# Patient Record
Sex: MALE | Race: WHITE
[De-identification: names, ages, dates, MRNs, and addresses within clinical notes are randomized per-mention and may not be internally consistent; named-entity substitution may affect disease eponyms.]

---

## 2018-01-25 PROBLEM — Z00.00 ENCOUNTER FOR PREVENTIVE HEALTH EXAMINATION: Status: ACTIVE | Noted: 2018-01-25

## 2018-02-20 ENCOUNTER — APPOINTMENT (OUTPATIENT)
Dept: PLASTIC SURGERY | Facility: CLINIC | Age: 63
End: 2018-02-20
Payer: COMMERCIAL

## 2018-02-20 VITALS — BODY MASS INDEX: 31.1 KG/M2 | WEIGHT: 210 LBS | HEIGHT: 69 IN

## 2018-02-20 DIAGNOSIS — Z86.79 PERSONAL HISTORY OF OTHER DISEASES OF THE CIRCULATORY SYSTEM: ICD-10-CM

## 2018-02-20 DIAGNOSIS — Z80.42 FAMILY HISTORY OF MALIGNANT NEOPLASM OF PROSTATE: ICD-10-CM

## 2018-02-20 DIAGNOSIS — C64.1 MALIGNANT NEOPLASM OF RIGHT KIDNEY, EXCEPT RENAL PELVIS: ICD-10-CM

## 2018-02-20 DIAGNOSIS — J44.9 CHRONIC OBSTRUCTIVE PULMONARY DISEASE, UNSPECIFIED: ICD-10-CM

## 2018-02-20 DIAGNOSIS — L72.9 FOLLICULAR CYST OF THE SKIN AND SUBCUTANEOUS TISSUE, UNSPECIFIED: ICD-10-CM

## 2018-02-20 DIAGNOSIS — Z80.3 FAMILY HISTORY OF MALIGNANT NEOPLASM OF BREAST: ICD-10-CM

## 2018-02-20 PROCEDURE — 99203 OFFICE O/P NEW LOW 30 MIN: CPT

## 2018-02-20 RX ORDER — UMECLIDINIUM BROMIDE AND VILANTEROL TRIFENATATE 62.5; 25 UG/1; UG/1
62.5-25 POWDER RESPIRATORY (INHALATION)
Refills: 0 | Status: ACTIVE | COMMUNITY

## 2018-02-20 RX ORDER — VARENICLINE TARTRATE 1 MG/1
1 TABLET, FILM COATED ORAL
Refills: 0 | Status: ACTIVE | COMMUNITY

## 2018-02-20 RX ORDER — ATENOLOL 100 MG/1
100 TABLET ORAL
Refills: 0 | Status: ACTIVE | COMMUNITY

## 2018-02-20 RX ORDER — FAMOTIDINE 40 MG/1
40 TABLET, FILM COATED ORAL
Refills: 0 | Status: ACTIVE | COMMUNITY

## 2018-02-20 RX ORDER — ASPIRIN ENTERIC COATED TABLETS 81 MG 81 MG/1
81 TABLET, DELAYED RELEASE ORAL
Refills: 0 | Status: ACTIVE | COMMUNITY

## 2018-02-20 RX ORDER — LOSARTAN POTASSIUM 50 MG/1
50 TABLET, FILM COATED ORAL
Refills: 0 | Status: ACTIVE | COMMUNITY

## 2018-02-20 RX ORDER — HYDROCHLOROTHIAZIDE 25 MG/1
25 TABLET ORAL
Refills: 0 | Status: ACTIVE | COMMUNITY

## 2018-04-20 ENCOUNTER — APPOINTMENT (OUTPATIENT)
Dept: PLASTIC SURGERY | Facility: CLINIC | Age: 63
End: 2018-04-20

## 2018-05-11 ENCOUNTER — APPOINTMENT (OUTPATIENT)
Dept: PLASTIC SURGERY | Facility: CLINIC | Age: 63
End: 2018-05-11

## 2019-12-27 ENCOUNTER — EMERGENCY (EMERGENCY)
Facility: HOSPITAL | Age: 64
LOS: 0 days | Discharge: HOME | End: 2019-12-27
Attending: EMERGENCY MEDICINE | Admitting: INTERNAL MEDICINE
Payer: COMMERCIAL

## 2019-12-27 VITALS
TEMPERATURE: 98 F | SYSTOLIC BLOOD PRESSURE: 121 MMHG | HEART RATE: 62 BPM | DIASTOLIC BLOOD PRESSURE: 75 MMHG | RESPIRATION RATE: 18 BRPM

## 2019-12-27 VITALS
HEART RATE: 69 BPM | DIASTOLIC BLOOD PRESSURE: 87 MMHG | OXYGEN SATURATION: 95 % | TEMPERATURE: 96 F | SYSTOLIC BLOOD PRESSURE: 130 MMHG | RESPIRATION RATE: 16 BRPM
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
## 2019-12-27 DIAGNOSIS — I10 ESSENTIAL (PRIMARY) HYPERTENSION: ICD-10-CM

## 2019-12-27 DIAGNOSIS — R55 SYNCOPE AND COLLAPSE: ICD-10-CM

## 2019-12-27 DIAGNOSIS — C78.00 SECONDARY MALIGNANT NEOPLASM OF UNSPECIFIED LUNG: ICD-10-CM

## 2019-12-27 DIAGNOSIS — S09.90XA UNSPECIFIED INJURY OF HEAD, INITIAL ENCOUNTER: ICD-10-CM

## 2019-12-27 DIAGNOSIS — E78.5 HYPERLIPIDEMIA, UNSPECIFIED: ICD-10-CM

## 2019-12-27 DIAGNOSIS — Z85.53 PERSONAL HISTORY OF MALIGNANT NEOPLASM OF RENAL PELVIS: ICD-10-CM

## 2019-12-27 DIAGNOSIS — R05 COUGH: ICD-10-CM

## 2019-12-27 DIAGNOSIS — Z90.5 ACQUIRED ABSENCE OF KIDNEY: ICD-10-CM

## 2019-12-27 LAB
ALBUMIN SERPL ELPH-MCNC: 4.3 G/DL — SIGNIFICANT CHANGE UP (ref 3.5–5.2)
ALP SERPL-CCNC: 89 U/L — SIGNIFICANT CHANGE UP (ref 30–115)
ALT FLD-CCNC: 21 U/L — SIGNIFICANT CHANGE UP (ref 0–41)
ANION GAP SERPL CALC-SCNC: 18 MMOL/L — HIGH (ref 7–14)
AST SERPL-CCNC: 16 U/L — SIGNIFICANT CHANGE UP (ref 0–41)
BASOPHILS # BLD AUTO: 0.05 K/UL — SIGNIFICANT CHANGE UP (ref 0–0.2)
BASOPHILS NFR BLD AUTO: 0.5 % — SIGNIFICANT CHANGE UP (ref 0–1)
BILIRUB DIRECT SERPL-MCNC: <0.2 MG/DL — SIGNIFICANT CHANGE UP (ref 0–0.2)
BILIRUB INDIRECT FLD-MCNC: >0.3 MG/DL — SIGNIFICANT CHANGE UP (ref 0.2–1.2)
BILIRUB SERPL-MCNC: 0.5 MG/DL — SIGNIFICANT CHANGE UP (ref 0.2–1.2)
BUN SERPL-MCNC: 42 MG/DL — HIGH (ref 10–20)
CALCIUM SERPL-MCNC: 10.1 MG/DL — SIGNIFICANT CHANGE UP (ref 8.5–10.1)
CHLORIDE SERPL-SCNC: 100 MMOL/L — SIGNIFICANT CHANGE UP (ref 98–110)
CO2 SERPL-SCNC: 23 MMOL/L — SIGNIFICANT CHANGE UP (ref 17–32)
CREAT SERPL-MCNC: 1.5 MG/DL — SIGNIFICANT CHANGE UP (ref 0.7–1.5)
EOSINOPHIL # BLD AUTO: 0.14 K/UL — SIGNIFICANT CHANGE UP (ref 0–0.7)
EOSINOPHIL NFR BLD AUTO: 1.3 % — SIGNIFICANT CHANGE UP (ref 0–8)
GLUCOSE SERPL-MCNC: 192 MG/DL — HIGH (ref 70–99)
HCT VFR BLD CALC: 45.5 % — SIGNIFICANT CHANGE UP (ref 42–52)
HGB BLD-MCNC: 14.5 G/DL — SIGNIFICANT CHANGE UP (ref 14–18)
IMM GRANULOCYTES NFR BLD AUTO: 0.8 % — HIGH (ref 0.1–0.3)
LIDOCAIN IGE QN: 27 U/L — SIGNIFICANT CHANGE UP (ref 7–60)
LYMPHOCYTES # BLD AUTO: 1.67 K/UL — SIGNIFICANT CHANGE UP (ref 1.2–3.4)
LYMPHOCYTES # BLD AUTO: 16 % — LOW (ref 20.5–51.1)
MAGNESIUM SERPL-MCNC: 1.9 MG/DL — SIGNIFICANT CHANGE UP (ref 1.8–2.4)
MCHC RBC-ENTMCNC: 28.4 PG — SIGNIFICANT CHANGE UP (ref 27–31)
MCHC RBC-ENTMCNC: 31.9 G/DL — LOW (ref 32–37)
MCV RBC AUTO: 89 FL — SIGNIFICANT CHANGE UP (ref 80–94)
MONOCYTES # BLD AUTO: 0.66 K/UL — HIGH (ref 0.1–0.6)
MONOCYTES NFR BLD AUTO: 6.3 % — SIGNIFICANT CHANGE UP (ref 1.7–9.3)
NEUTROPHILS # BLD AUTO: 7.86 K/UL — HIGH (ref 1.4–6.5)
NEUTROPHILS NFR BLD AUTO: 75.1 % — SIGNIFICANT CHANGE UP (ref 42.2–75.2)
NRBC # BLD: 0 /100 WBCS — SIGNIFICANT CHANGE UP (ref 0–0)
PLATELET # BLD AUTO: 229 K/UL — SIGNIFICANT CHANGE UP (ref 130–400)
POTASSIUM SERPL-MCNC: 3.8 MMOL/L — SIGNIFICANT CHANGE UP (ref 3.5–5)
POTASSIUM SERPL-SCNC: 3.8 MMOL/L — SIGNIFICANT CHANGE UP (ref 3.5–5)
PROT SERPL-MCNC: 6.8 G/DL — SIGNIFICANT CHANGE UP (ref 6–8)
RBC # BLD: 5.11 M/UL — SIGNIFICANT CHANGE UP (ref 4.7–6.1)
RBC # FLD: 13 % — SIGNIFICANT CHANGE UP (ref 11.5–14.5)
SODIUM SERPL-SCNC: 141 MMOL/L — SIGNIFICANT CHANGE UP (ref 135–146)
TROPONIN T SERPL-MCNC: <0.01 NG/ML — SIGNIFICANT CHANGE UP
TROPONIN T SERPL-MCNC: <0.01 NG/ML — SIGNIFICANT CHANGE UP
WBC # BLD: 10.46 K/UL — SIGNIFICANT CHANGE UP (ref 4.8–10.8)
WBC # FLD AUTO: 10.46 K/UL — SIGNIFICANT CHANGE UP (ref 4.8–10.8)

## 2019-12-27 PROCEDURE — 72125 CT NECK SPINE W/O DYE: CPT | Mod: 26

## 2019-12-27 PROCEDURE — 93010 ELECTROCARDIOGRAM REPORT: CPT

## 2019-12-27 PROCEDURE — 99220: CPT | Mod: 25

## 2019-12-27 PROCEDURE — 70450 CT HEAD/BRAIN W/O DYE: CPT | Mod: 26

## 2019-12-27 PROCEDURE — 71045 X-RAY EXAM CHEST 1 VIEW: CPT | Mod: 26

## 2019-12-27 PROCEDURE — 12002 RPR S/N/AX/GEN/TRNK2.6-7.5CM: CPT

## 2019-12-27 RX ORDER — ACETAMINOPHEN 500 MG
650 TABLET ORAL ONCE
Refills: 0 | Status: COMPLETED | OUTPATIENT
Start: 2019-12-27 | End: 2019-12-27

## 2019-12-27 RX ORDER — TETANUS TOXOID, REDUCED DIPHTHERIA TOXOID AND ACELLULAR PERTUSSIS VACCINE, ADSORBED 5; 2.5; 8; 8; 2.5 [IU]/.5ML; [IU]/.5ML; UG/.5ML; UG/.5ML; UG/.5ML
0.5 SUSPENSION INTRAMUSCULAR ONCE
Refills: 0 | Status: COMPLETED | OUTPATIENT
Start: 2019-12-27 | End: 2019-12-27

## 2019-12-27 RX ORDER — SODIUM CHLORIDE 9 MG/ML
1000 INJECTION, SOLUTION INTRAVENOUS ONCE
Refills: 0 | Status: COMPLETED | OUTPATIENT
Start: 2019-12-27 | End: 2019-12-27

## 2019-12-27 RX ADMIN — Medication 650 MILLIGRAM(S): at 13:38

## 2019-12-27 RX ADMIN — TETANUS TOXOID, REDUCED DIPHTHERIA TOXOID AND ACELLULAR PERTUSSIS VACCINE, ADSORBED 0.5 MILLILITER(S): 5; 2.5; 8; 8; 2.5 SUSPENSION INTRAMUSCULAR at 11:37

## 2019-12-27 RX ADMIN — SODIUM CHLORIDE 1000 MILLILITER(S): 9 INJECTION, SOLUTION INTRAVENOUS at 13:40

## 2019-12-27 NOTE — ED ADULT NURSE NOTE - NSIMPLEMENTINTERV_GEN_ALL_ED
Implemented All Fall Risk Interventions:  [place] to call system. Call bell, personal items and telephone within reach. Instruct patient to call for assistance. Room bathroom lighting operational. Non-slip footwear when patient is off stretcher. Physically safe environment: no spills, clutter or unnecessary equipment. Stretcher in lowest position, wheels locked, appropriate side rails in place. Provide visual cue, wrist band, yellow gown, etc. Monitor gait and stability. Monitor for mental status changes and reorient to person, place, and time. Review medications for side effects contributing to fall risk. Reinforce activity limits and safety measures with patient and family.

## 2019-12-27 NOTE — ED CDU PROVIDER DISPOSITION NOTE - NSFOLLOWUPINSTRUCTIONS_ED_ALL_ED_FT
Closed Head Injury    Closed head injury in an injury to your head that may or may not involve a traumatic brain injury (TBI). Symptoms of TBI can be short or long lasting and include headache, dizziness, interference with memory or speech, fatigue, confusion, changes in sleep, mood changes, nausea, depression/anxiety, and dulling of senses. Make sure to obtain proper rest which includes getting plenty of sleep, avoiding excessive visual stimulation, and avoiding activities that may cause physical or mental stress. Avoid any situation where there is potential for another head injury including sports.    SEEK MEDICAL CARE IF YOU HAVE THE FOLLOWING SYMPTOMS: unusual drowsiness, vomiting, severe dizziness, seizures, lightheadedness, muscular weakness, different pupil sizes, visual changes, or clear or bloody discharge from your ears or nose.      Follow up with your primary care physician. If a lung nodule is new or has changed in size, shape or appearance, your doctor may recommend further testing — such as a CT scan, positron emission tomography (PET) scan, bronchoscopy or tissue biopsy — to determine if it's cancerous.     PULMONARY NODULES - AfterCare(R) Instructions(ER/ED)     Pulmonary Nodules    WHAT YOU NEED TO KNOW:    Pulmonary nodules are areas of abnormal tissue in your lungs. You may not have any symptoms, or you may have chest tightness, a cough, chest pain, or shortness of breath. Nodules are usually found with an x-ray or CT scan. Most nodules are not cancerous. However, it is still important for you to return for follow-up testing to monitor your condition.     DISCHARGE INSTRUCTIONS:    Call 911 for any of the following:     You have severe shortness of breath or trouble breathing.       Your lips or nails look blue or pale.    Return to the emergency department if:     You cough up blood.      You suddenly feel lightheaded or are short of breath.      You have chest pain when you take a deep breath or cough.      You cannot think clearly.    Contact your healthcare provider if:     Your symptoms do not improve.      You have new symptoms.       You have questions or concerns about your condition or care.    Follow up with your healthcare provider: Your healthcare provider will refer you to a pulmonologist. Your pulmonologist will monitor your nodules for any change or growth. Nodules that grow quickly may require a biopsy to check for cancer. You may need to be monitored for 1 to 3 years. Write down your questions so you remember to ask them during your visits.     Do not smoke: Nicotine and other chemicals in cigarettes and cigars can cause lung damage or cancer. Stay away from others who smoke. Ask your healthcare provider for information if you or someone close to you currently smokes and needs help to quit. E-cigarettes or smokeless tobacco still contain nicotine. Talk to your healthcare provider before you use these products.        © Copyright [employer] 2019           Syncope    Syncope is when you temporarily lose consciousness, also called fainting or passing out. It is caused by a sudden decrease in blood flow to the brain. Even though most causes of syncope are not dangerous, syncope can possibly be a sign of a serious medical problem. Signs that you may be about to faint include feeling dizzy, lightheaded, nausea, visual changes, or cold/clammy skin. Do not drive, operate heavy machinery, or play sports until your health care provider says it is okay.    SEEK IMMEDIATE MEDICAL CARE IF YOU HAVE ANY OF THE FOLLOWING SYMPTOMS: severe headache, pain in your chest/abdomen/back, bleeding from your mouth or rectum, palpitations, shortness of breath, pain with breathing, seizure, confusion, or trouble walking.    Follow up with your primary medical doctor in 1-2 days

## 2019-12-27 NOTE — ED CDU PROVIDER INITIAL DAY NOTE - OBJECTIVE STATEMENT
64 y.o male w/ hx of renal ca w/ metastasis to lung, HTN, s/p nephrectomy presents to the ED for evaluation of fall. 64 y.o male w/ hx of renal ca w/ metastasis to lung, HTN, s/p nephrectomy presents to the ED for evaluation of fall.  States he was having a coughing fit, felt lightheaded and fell down with questionable LOC.  Sustained a laceration to L parietal scalp.  Since then acting at baseline with no recurrent sxs.  Complaining of mild pain L scalp, mild severity, worse w/ palpation, alleviated with tylenol, no radiation of pain.  Had recent echo in April at [facility] which was WNL.  Denies preceding chest pain, dyspnea, headache, vomiting, back pain/abd pain, extremity pain.

## 2019-12-27 NOTE — ED PROVIDER NOTE - OBJECTIVE STATEMENT
64 year old male with a pmh of htn hld kidney ca with meds to lung not currently on chemotherarpy presents here s/p syncopal event. Patient was drinking hot chocolate and suddenly fell. + loc not on a/c. Patient sustained a laceration. Patient has had a chronic cough no new cough. Denies any recent fever chills cp sob n/v abdominal pain urinary complaints or prodormal symptoms.     Cardiologist: james

## 2019-12-27 NOTE — ED CDU PROVIDER INITIAL DAY NOTE - NS ED ROS FT
Constitutional: See HPI.  Eyes: No visual changes, eye pain or discharge.   ENMT: No hearing changes, pain, discharge or infections. No neck pain or stiffness. No limited ROM  Cardiac: No SOB or edema. No chest pain with exertion.  Respiratory: + chronic cough from lung ca. No respiratory distress. No hemoptysis. No history of asthma or RAD.  GI: No nausea, vomiting, diarrhea or abdominal pain.  : No dysuria, frequency or burning. No Discharge  MS: No myalgia, muscle weakness, joint pain or back pain.  Neuro: No headache or weakness. questionable LOC.  Skin: + lac, + scalp pain   Except as documented in the HPI, all other systems are negative.

## 2019-12-27 NOTE — ED ADULT TRIAGE NOTE - CHIEF COMPLAINT QUOTE
pt fell asleep while reading newspaper, fell onto floor off chair. laceration to head. GCS=15. no blood thinners.

## 2019-12-27 NOTE — ED CDU PROVIDER DISPOSITION NOTE - ATTENDING CONTRIBUTION TO CARE
63yo man h/o renal CA s/p nephrectomy, known metastasis to the lungs followed at [facility], HTN, HLD was placed in CDU after a syncopal episode. Pt was having a "coughing episode" and then felt lightheaded and passed out briefly. Sustained lac to L parietal scalp which was repaired in the ED. Otherwise feeling at well, no chest pain, HA, nausea, vomiting, weakness. Pt has frequent coughing fits and wheezing at baseline. VS, exam as noted, pt alert and comfortable, lac as noted, neck supple, CVS1S2 RRR abd soft, NT, ND. ED workup with EKG, labs, head and C-spine CT was unremarkable. Pt was monitored in CDU, repeat EKG and trop were normal. Wife at the bedside produced echo results from 4/2019 which was normal (and no change from 2018 echo was noted). Pt follows with Dr Cavazos, no need to repeat echo in [facility], pt d/c to follow up, wound care instructions provided.

## 2019-12-27 NOTE — ED CDU PROVIDER DISPOSITION NOTE - CARE PROVIDER_API CALL
Shayan Cavazos)  Cardiology; Interventional Cardiology  [address], [address]  [place] [address]  Phone: [phone]  Fax: [phone]  Follow Up Time: 1-3 Days    Ricky Downey)  Critical Care Medicine; Geriatric Medicine; Internal Medicine; Pulmonary Disease  [address] [place] [address], [address]  [place] [address]  Phone: [phone]  Fax: [phone]  Follow Up Time: 1-3 Days Shayan Cavazos)  Cardiology; Interventional Cardiology  [address], [address]  [place] [address]  Phone: [phone]  Fax: [phone]  Follow Up Time: 1-3 Days    Ricky Downey)  Critical Care Medicine; Geriatric Medicine; Internal Medicine; Pulmonary Disease  [address], [address]  [place] [address]  Phone: [phone]  Fax: [phone]  Follow Up Time: 1-3 Days    Andrez Diaz)  Otolaryngology  [address], [address] [place] [address]  Phone: [phone]  Fax: [phone]  Follow Up Time: 1-3 Days

## 2019-12-27 NOTE — ED CDU PROVIDER INITIAL DAY NOTE - MEDICAL DECISION MAKING DETAILS
63yo man h/o renal CA s/p nephrectomy, known metastasis to the lungs followed at [facility], HTN, HLD was placed in CDU after a syncopal episode. Pt was having a "coughing episode" and then felt lightheaded and passed out briefly. Sustained lac to L parietal scalp which was repaired in the ED. Otherwise feeling at well, no chest pain, HA, nausea, vomiting, weakness. Pt has frequent coughing fits and wheezing at baseline. VS, exam as noted, pt alert and comfortable, lac as noted, neck supple, CVS1S2 RRR abd soft, NT, ND. ED workup with EKG, labs, head and C-spine CT was unremarkable. Plan is for serial EKG/enzymes/echo.

## 2019-12-27 NOTE — ED PROVIDER NOTE - ATTENDING CONTRIBUTION TO CARE
64 year old male, pmhx HTN, HLD, Kidney CA with mets to lung (not on chemo) presenting s/p syncopal episode. Patient was drinking hot chocolate and then suddenly had LOC. (+) fell hitting his head. Patient denies full memory of the event but states he does not remember having any lightheadedness, chest pain, dyspnea, headache, abdominal pain or any other pre-syncopal symptoms. At this time patient endorsing isolated head pain described as sharp, non-radiating, located at the top of his head, no palliative or provocative factors, mild severity.     Vital Signs: I have reviewed the initial vital signs.  Constitutional: NAD, well-nourished, appears stated age, no acute distress.  HEENT: Airway patent, moist MM, no erythema/swelling/deformity of oral structures. EOMI, PERRLA.  CV: regular rate, regular rhythm, well-perfused extremities, 2+ b/l DP and radial pulses equal.  Lungs: BCTA, no increased WOB.  ABD: NTND, no guarding or rebound, no pulsatile mass, no hernias.   MSK: Neck supple, nontender, nl ROM, no stepoff. Chest nontender. Back nontender in TLS spine or to b/l bony structures or flanks. Ext nontender, nl rom, no deformity.   INTEG: Skin warm, dry, no rash. (+) 3cm scalp laceration, bleeding controlled  NEURO: A&Ox3, normal strength, nl sensation throughout, normal speech.   PSYCH: Calm, cooperative, normal affect and interaction.    Neuro intact. Will obtain syncopal work up, CT head and c-spine for trauma, irrigate and staple laceration, re-eval.

## 2019-12-27 NOTE — ED CDU PROVIDER DISPOSITION NOTE - PROVIDER TOKENS
PROVIDER:[TOKEN:[[id number]],FOLLOWUP:[1-3 Days]],PROVIDER:[TOKEN:[[id number]],FOLLOWUP:[1-3 Days]] PROVIDER:[TOKEN:[[id number]],FOLLOWUP:[1-3 Days]],PROVIDER:[TOKEN:[[id number]],FOLLOWUP:[1-3 Days]],PROVIDER:[TOKEN:[[id number]],FOLLOWUP:[1-3 Days]]

## 2019-12-27 NOTE — ED CDU PROVIDER INITIAL DAY NOTE - PHYSICAL EXAMINATION
CONST: Well appearing in NAD  HEAD: NC, linear sutured lac L scalp, no step off or deformity, no bleeding, no surrounding erythema  EYES: PERRL, EOMI, Sclera and conjunctiva clear.  NECK: No midline C/T/L tenderness  CARD: Normal S1 S2; Normal rate and rhythm  RESP: Equal BS B/L, No wheezes, rhonchi or rales. No distress  GI: Soft, non-tender, non-distended.  MS: Normal ROM in all extremities. No edema of lower extremities, no calf pain, radial pulses 2+ bilaterally  SKIN: see head exam  NEURO: A&Ox3, No focal deficits. Strength 5/5 with no sensory deficits. Steady gait

## 2019-12-27 NOTE — ED ADULT NURSE NOTE - PMH
High cholesterol    HTN (hypertension)    Kidney cancer, primary, with metastasis from kidney to other site
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
## 2019-12-27 NOTE — ED CDU PROVIDER DISPOSITION NOTE - CLINICAL COURSE
63yo man h/o renal CA s/p nephrectomy, known metastasis to the lungs followed at [facility], HTN, HLD was placed in CDU after a syncopal episode. Pt was having a "coughing episode" and then felt lightheaded and passed out briefly. Sustained lac to L parietal scalp which was repaired in the ED. Otherwise feeling at well, no chest pain, HA, nausea, vomiting, weakness. Pt has frequent coughing fits and wheezing at baseline. VS, exam as noted, pt alert and comfortable, lac as noted, neck supple, CVS1S2 RRR abd soft, NT, ND. ED workup with EKG, labs, head and C-spine CT was unremarkable. Pt was monitored in CDU, repeat EKG and trop were normal. Wife at the bedside produced echo results from 4/2019 which was normal (and no change from 2018 echo was noted). Pt follows with Dr Cavazos, no need to repeat echo in [facility], pt d/c to follow up, wound care instructions provided.

## 2019-12-27 NOTE — ED CDU PROVIDER DISPOSITION NOTE - CARE PROVIDERS DIRECT ADDRESSES
[email or web address]

## 2019-12-27 NOTE — ED PROVIDER NOTE - NS ED ROS FT
Constitutional: See HPI.  Eyes: No visual changes,   ENMT: No neck pain  Cardiac: No cp  Respiratory: + cough   GI: No nausea, vomiting, diarrhea or abdominal pain.  : No dysuria, frequency or burning.   MS: No myalgia, muscle weakness, joint pain or back pain.  Psych: No suicidal or homicidal ideations.  Neuro: see hpi  Skin: No skin rash.
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
## 2019-12-27 NOTE — ED CDU PROVIDER DISPOSITION NOTE - PATIENT PORTAL LINK FT
You can access the FollowMyHealth Patient Portal offered by [facility] by registering at the following website: [email or web address]. By joining [employer]’s FollowMyHealth portal, you will also be able to view your health information using other applications (apps) compatible with our system.

## 2019-12-27 NOTE — ED PROVIDER NOTE - PHYSICAL EXAMINATION
CONSTITUTIONAL: WA / WN / NAD  HEAD: + 3cm left scalp laceration  EYES: PERRL; EOMI;   ENT: Normal pharynx; mucous membranes pink/moist, no erythema.  NECK: Supple; no meningeal signs  CARD: RRR; nl S1/S2; no M/R/G.   RESP: Respiratory rate and effort are normal; breath sounds clear and equal bilaterally.  ABD: Soft, NT ND   MSK/EXT: No gross deformities; full range of motion.  SKIN: Warm and dry;   NEURO: AAOx3,  PSYCH: Memory Intact, Normal Affect

## 2019-12-27 NOTE — ED CDU PROVIDER INITIAL DAY NOTE - PROGRESS NOTE DETAILS
Discussed results with pt.  All questions were answered and return precautions discussed.  Pt is asx and comfortable at this time.  Unremarkable re-exam.  No further concerns at this time from pt.  Will follow up with PMD and cardio.  Pt understands and agrees with tx plan. discussed all incidental findings with pt.

## 2020-09-17 ENCOUNTER — EMERGENCY (EMERGENCY)
Facility: HOSPITAL | Age: 65
LOS: 0 days | Discharge: HOME | End: 2020-09-17
Attending: EMERGENCY MEDICINE | Admitting: EMERGENCY MEDICINE
Payer: COMMERCIAL

## 2020-09-17 VITALS — DIASTOLIC BLOOD PRESSURE: 88 MMHG | HEART RATE: 98 BPM | SYSTOLIC BLOOD PRESSURE: 142 MMHG

## 2020-09-17 VITALS
WEIGHT: 235.01 LBS | DIASTOLIC BLOOD PRESSURE: 80 MMHG | SYSTOLIC BLOOD PRESSURE: 146 MMHG | RESPIRATION RATE: 19 BRPM | HEIGHT: 69 IN | TEMPERATURE: 98 F | HEART RATE: 106 BPM | OXYGEN SATURATION: 99 %

## 2020-09-17 DIAGNOSIS — I10 ESSENTIAL (PRIMARY) HYPERTENSION: ICD-10-CM
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
## 2020-09-17 DIAGNOSIS — T22.211A BURN OF SECOND DEGREE OF RIGHT FOREARM, INITIAL ENCOUNTER: ICD-10-CM

## 2020-09-17 DIAGNOSIS — E78.5 HYPERLIPIDEMIA, UNSPECIFIED: ICD-10-CM

## 2020-09-17 DIAGNOSIS — T31.0 BURNS INVOLVING LESS THAN 10% OF BODY SURFACE: ICD-10-CM

## 2020-09-17 DIAGNOSIS — Z85.528 PERSONAL HISTORY OF OTHER MALIGNANT NEOPLASM OF KIDNEY: ICD-10-CM

## 2020-09-17 DIAGNOSIS — T30.0 BURN OF UNSPECIFIED BODY REGION, UNSPECIFIED DEGREE: ICD-10-CM

## 2020-09-17 DIAGNOSIS — E78.00 PURE HYPERCHOLESTEROLEMIA, UNSPECIFIED: ICD-10-CM

## 2020-09-17 DIAGNOSIS — T23.261A BURN OF SECOND DEGREE OF BACK OF RIGHT HAND, INITIAL ENCOUNTER: ICD-10-CM
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
## 2020-09-17 LAB
ALBUMIN SERPL ELPH-MCNC: 4.4 G/DL — SIGNIFICANT CHANGE UP (ref 3.5–5.2)
ALP SERPL-CCNC: 105 U/L — SIGNIFICANT CHANGE UP (ref 30–115)
ALT FLD-CCNC: 26 U/L — SIGNIFICANT CHANGE UP (ref 0–41)
ANION GAP SERPL CALC-SCNC: 13 MMOL/L — SIGNIFICANT CHANGE UP (ref 7–14)
AST SERPL-CCNC: 18 U/L — SIGNIFICANT CHANGE UP (ref 0–41)
BASOPHILS # BLD AUTO: 0.03 K/UL — SIGNIFICANT CHANGE UP (ref 0–0.2)
BASOPHILS NFR BLD AUTO: 0.3 % — SIGNIFICANT CHANGE UP (ref 0–1)
BILIRUB SERPL-MCNC: 1.1 MG/DL — SIGNIFICANT CHANGE UP (ref 0.2–1.2)
BUN SERPL-MCNC: 29 MG/DL — HIGH (ref 10–20)
CALCIUM SERPL-MCNC: 9.9 MG/DL — SIGNIFICANT CHANGE UP (ref 8.5–10.1)
CHLORIDE SERPL-SCNC: 98 MMOL/L — SIGNIFICANT CHANGE UP (ref 98–110)
CO2 SERPL-SCNC: 29 MMOL/L — SIGNIFICANT CHANGE UP (ref 17–32)
CREAT SERPL-MCNC: 1.8 MG/DL — HIGH (ref 0.7–1.5)
EOSINOPHIL # BLD AUTO: 0.1 K/UL — SIGNIFICANT CHANGE UP (ref 0–0.7)
EOSINOPHIL NFR BLD AUTO: 0.8 % — SIGNIFICANT CHANGE UP (ref 0–8)
GLUCOSE SERPL-MCNC: 142 MG/DL — HIGH (ref 70–99)
HCT VFR BLD CALC: 45.8 % — SIGNIFICANT CHANGE UP (ref 42–52)
HGB BLD-MCNC: 14.6 G/DL — SIGNIFICANT CHANGE UP (ref 14–18)
IMM GRANULOCYTES NFR BLD AUTO: 0.7 % — HIGH (ref 0.1–0.3)
LYMPHOCYTES # BLD AUTO: 1.84 K/UL — SIGNIFICANT CHANGE UP (ref 1.2–3.4)
LYMPHOCYTES # BLD AUTO: 15.6 % — LOW (ref 20.5–51.1)
MCHC RBC-ENTMCNC: 27.7 PG — SIGNIFICANT CHANGE UP (ref 27–31)
MCHC RBC-ENTMCNC: 31.9 G/DL — LOW (ref 32–37)
MCV RBC AUTO: 86.9 FL — SIGNIFICANT CHANGE UP (ref 80–94)
MONOCYTES # BLD AUTO: 0.97 K/UL — HIGH (ref 0.1–0.6)
MONOCYTES NFR BLD AUTO: 8.2 % — SIGNIFICANT CHANGE UP (ref 1.7–9.3)
NEUTROPHILS # BLD AUTO: 8.81 K/UL — HIGH (ref 1.4–6.5)
NEUTROPHILS NFR BLD AUTO: 74.4 % — SIGNIFICANT CHANGE UP (ref 42.2–75.2)
NRBC # BLD: 0 /100 WBCS — SIGNIFICANT CHANGE UP (ref 0–0)
PLATELET # BLD AUTO: 227 K/UL — SIGNIFICANT CHANGE UP (ref 130–400)
POTASSIUM SERPL-MCNC: 3.3 MMOL/L — LOW (ref 3.5–5)
POTASSIUM SERPL-SCNC: 3.3 MMOL/L — LOW (ref 3.5–5)
PROT SERPL-MCNC: 6.9 G/DL — SIGNIFICANT CHANGE UP (ref 6–8)
RBC # BLD: 5.27 M/UL — SIGNIFICANT CHANGE UP (ref 4.7–6.1)
RBC # FLD: 14 % — SIGNIFICANT CHANGE UP (ref 11.5–14.5)
SODIUM SERPL-SCNC: 140 MMOL/L — SIGNIFICANT CHANGE UP (ref 135–146)
WBC # BLD: 11.83 K/UL — HIGH (ref 4.8–10.8)
WBC # FLD AUTO: 11.83 K/UL — HIGH (ref 4.8–10.8)

## 2020-09-17 PROCEDURE — 99284 EMERGENCY DEPT VISIT MOD MDM: CPT

## 2020-09-17 RX ORDER — SODIUM CHLORIDE 9 MG/ML
1000 INJECTION, SOLUTION INTRAVENOUS ONCE
Refills: 0 | Status: COMPLETED | OUTPATIENT
Start: 2020-09-17 | End: 2020-09-17

## 2020-09-17 RX ORDER — MORPHINE SULFATE 50 MG/1
4 CAPSULE, EXTENDED RELEASE ORAL ONCE
Refills: 0 | Status: DISCONTINUED | OUTPATIENT
Start: 2020-09-17 | End: 2020-09-17

## 2020-09-17 RX ADMIN — MORPHINE SULFATE 4 MILLIGRAM(S): 50 CAPSULE, EXTENDED RELEASE ORAL at 16:14

## 2020-09-17 RX ADMIN — SODIUM CHLORIDE 1000 MILLILITER(S): 9 INJECTION, SOLUTION INTRAVENOUS at 16:14
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
## 2020-09-17 NOTE — ED PROVIDER NOTE - PATIENT PORTAL LINK FT
You can access the FollowMyHealth Patient Portal offered by [facility] by registering at the following website: [email or web address]. By joining [employer]’s FollowMyHealth portal, you will also be able to view your health information using other applications (apps) compatible with our system.
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
## 2020-09-17 NOTE — ED PROVIDER NOTE - NSFOLLOWUPCLINICS_GEN_ALL_ED_FT
[facility]  Burn  [address], [address]  [place] [address]  Phone: [phone]  Fax:   Follow Up Time: 1-3 Days
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
## 2020-09-17 NOTE — ED ADULT NURSE NOTE - NSIMPLEMENTINTERV_GEN_ALL_ED
Implemented All Universal Safety Interventions:  [place] to call system. Call bell, personal items and telephone within reach. Instruct patient to call for assistance. Room bathroom lighting operational. Non-slip footwear when patient is off stretcher. Physically safe environment: no spills, clutter or unnecessary equipment. Stretcher in lowest position, wheels locked, appropriate side rails in place.

## 2020-09-18 ENCOUNTER — EMERGENCY (EMERGENCY)
Facility: HOSPITAL | Age: 65
LOS: 0 days | Discharge: HOME | End: 2020-09-18
Attending: EMERGENCY MEDICINE | Admitting: EMERGENCY MEDICINE
Payer: COMMERCIAL

## 2020-09-18 VITALS
HEART RATE: 97 BPM | SYSTOLIC BLOOD PRESSURE: 138 MMHG | RESPIRATION RATE: 18 BRPM | OXYGEN SATURATION: 95 % | HEIGHT: 69 IN | TEMPERATURE: 97 F | DIASTOLIC BLOOD PRESSURE: 81 MMHG

## 2020-09-18 DIAGNOSIS — E78.00 PURE HYPERCHOLESTEROLEMIA, UNSPECIFIED: ICD-10-CM
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
## 2020-09-18 DIAGNOSIS — T22.111A BURN OF FIRST DEGREE OF RIGHT FOREARM, INITIAL ENCOUNTER: ICD-10-CM
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
## 2020-09-18 DIAGNOSIS — T23.261A BURN OF SECOND DEGREE OF BACK OF RIGHT HAND, INITIAL ENCOUNTER: ICD-10-CM

## 2020-09-18 DIAGNOSIS — E78.5 HYPERLIPIDEMIA, UNSPECIFIED: ICD-10-CM

## 2020-09-18 DIAGNOSIS — T30.0 BURN OF UNSPECIFIED BODY REGION, UNSPECIFIED DEGREE: ICD-10-CM

## 2020-09-18 DIAGNOSIS — I10 ESSENTIAL (PRIMARY) HYPERTENSION: ICD-10-CM

## 2020-09-18 DIAGNOSIS — T23.231A BURN OF SECOND DEGREE OF MULTIPLE RIGHT FINGERS (NAIL), NOT INCLUDING THUMB, INITIAL ENCOUNTER: ICD-10-CM

## 2020-09-18 DIAGNOSIS — Z90.5 ACQUIRED ABSENCE OF KIDNEY: ICD-10-CM
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
## 2020-09-18 PROCEDURE — 99284 EMERGENCY DEPT VISIT MOD MDM: CPT
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
## 2020-09-18 NOTE — ED ADULT NURSE NOTE - NSIMPLEMENTINTERV_GEN_ALL_ED
Implemented All Universal Safety Interventions:  [place] to call system. Call bell, personal items and telephone within reach. Instruct patient to call for assistance. Room bathroom lighting operational. Non-slip footwear when patient is off stretcher. Physically safe environment: no spills, clutter or unnecessary equipment. Stretcher in lowest position, wheels locked, appropriate side rails in place.
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
## 2020-09-18 NOTE — ED PROVIDER NOTE - NSFOLLOWUPCLINICS_GEN_ALL_ED_FT
[facility]  Burn  [address], [address]  [place] [address]  Phone: [phone]  Fax:   Follow Up Time:

## 2020-09-18 NOTE — ED PROVIDER NOTE - PATIENT PORTAL LINK FT
You can access the FollowMyHealth Patient Portal offered by [facility] by registering at the following website: [email or web address]. By joining [employer]’s FollowMyHealth portal, you will also be able to view your health information using other applications (apps) compatible with our system.

## 2020-09-22 ENCOUNTER — OUTPATIENT (OUTPATIENT)
Dept: OUTPATIENT SERVICES | Facility: HOSPITAL | Age: 65
LOS: 1 days | Discharge: HOME | End: 2020-09-22

## 2020-09-22 ENCOUNTER — APPOINTMENT (OUTPATIENT)
Dept: BURN CARE | Facility: CLINIC | Age: 65
End: 2020-09-22
Payer: COMMERCIAL

## 2020-09-22 DIAGNOSIS — T22.031A BURN OF UNSPECIFIED DEGREE OF RIGHT UPPER ARM, INITIAL ENCOUNTER: ICD-10-CM
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
## 2020-09-22 DIAGNOSIS — T23.001A BURN OF UNSPECIFIED DEGREE OF RIGHT HAND, UNSPECIFIED SITE, INITIAL ENCOUNTER: ICD-10-CM

## 2020-09-22 DIAGNOSIS — T30.0 BURN OF UNSPECIFIED BODY REGION, UNSPECIFIED DEGREE: ICD-10-CM

## 2020-09-22 DIAGNOSIS — T22.032A BURN OF UNSPECIFIED DEGREE OF LEFT UPPER ARM, INITIAL ENCOUNTER: ICD-10-CM
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
## 2020-09-22 PROCEDURE — 16030 DRESS/DEBRID P-THICK BURN L: CPT

## 2020-09-22 PROCEDURE — 99202 OFFICE O/P NEW SF 15 MIN: CPT | Mod: 25

## 2020-09-22 RX ORDER — HYDROMORPHONE HYDROCHLORIDE 4 MG/1
4 TABLET ORAL
Qty: 30 | Refills: 0 | Status: ACTIVE | COMMUNITY
Start: 2020-09-22 | End: 1900-01-01

## 2020-09-22 RX ORDER — SILVER SULFADIAZINE 10 MG/G
1 CREAM TOPICAL TWICE DAILY
Qty: 1 | Refills: 1 | Status: ACTIVE | COMMUNITY
Start: 2020-09-22 | End: 1900-01-01

## 2020-09-23 RX ORDER — HYDROMORPHONE HYDROCHLORIDE 4 MG/1
4 TABLET ORAL TWICE DAILY
Qty: 9 | Refills: 0 | Status: ACTIVE | COMMUNITY
Start: 2020-09-23 | End: 1900-01-01

## 2020-09-29 ENCOUNTER — OUTPATIENT (OUTPATIENT)
Dept: OUTPATIENT SERVICES | Facility: HOSPITAL | Age: 65
LOS: 1 days | Discharge: HOME | End: 2020-09-29

## 2020-09-29 ENCOUNTER — APPOINTMENT (OUTPATIENT)
Dept: BURN CARE | Facility: CLINIC | Age: 65
End: 2020-09-29
Payer: COMMERCIAL

## 2020-09-29 PROCEDURE — 16025 DRESS/DEBRID P-THICK BURN M: CPT

## 2020-09-29 PROCEDURE — 99213 OFFICE O/P EST LOW 20 MIN: CPT | Mod: 25

## 2020-10-14 DIAGNOSIS — T23.001A BURN OF UNSPECIFIED DEGREE OF RIGHT HAND, UNSPECIFIED SITE, INITIAL ENCOUNTER: ICD-10-CM

## 2020-10-14 DIAGNOSIS — T31.0 BURNS INVOLVING LESS THAN 10% OF BODY SURFACE: ICD-10-CM

## 2020-10-14 DIAGNOSIS — T22.00XA BURN OF UNSPECIFIED DEGREE OF SHOULDER AND UPPER LIMB, EXCEPT WRIST AND HAND, UNSPECIFIED SITE, INITIAL ENCOUNTER: ICD-10-CM
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
## 2021-04-27 ENCOUNTER — RESULT REVIEW (OUTPATIENT)
Age: 66
End: 2021-04-27

## 2021-04-27 ENCOUNTER — OUTPATIENT (OUTPATIENT)
Dept: OUTPATIENT SERVICES | Facility: HOSPITAL | Age: 66
LOS: 1 days | Discharge: HOME | End: 2021-04-27
Payer: COMMERCIAL

## 2021-04-27 VITALS
RESPIRATION RATE: 15 BRPM | HEART RATE: 72 BPM | OXYGEN SATURATION: 99 % | HEIGHT: 69 IN | DIASTOLIC BLOOD PRESSURE: 81 MMHG | TEMPERATURE: 98 F | SYSTOLIC BLOOD PRESSURE: 156 MMHG | WEIGHT: 247.36 LBS

## 2021-04-27 DIAGNOSIS — Z98.890 OTHER SPECIFIED POSTPROCEDURAL STATES: Chronic | ICD-10-CM

## 2021-04-27 DIAGNOSIS — Z01.818 ENCOUNTER FOR OTHER PREPROCEDURAL EXAMINATION: ICD-10-CM

## 2021-04-27 DIAGNOSIS — Z90.5 ACQUIRED ABSENCE OF KIDNEY: Chronic | ICD-10-CM

## 2021-04-27 DIAGNOSIS — L98.9 DISORDER OF THE SKIN AND SUBCUTANEOUS TISSUE, UNSPECIFIED: ICD-10-CM

## 2021-04-27 DIAGNOSIS — Z95.0 PRESENCE OF CARDIAC PACEMAKER: Chronic | ICD-10-CM

## 2021-04-27 LAB
ALBUMIN SERPL ELPH-MCNC: 4.5 G/DL — SIGNIFICANT CHANGE UP (ref 3.5–5.2)
ALP SERPL-CCNC: 116 U/L — HIGH (ref 30–115)
ALT FLD-CCNC: 27 U/L — SIGNIFICANT CHANGE UP (ref 0–41)
ANION GAP SERPL CALC-SCNC: 14 MMOL/L — SIGNIFICANT CHANGE UP (ref 7–14)
AST SERPL-CCNC: 20 U/L — SIGNIFICANT CHANGE UP (ref 0–41)
BASOPHILS # BLD AUTO: 0.05 K/UL — SIGNIFICANT CHANGE UP (ref 0–0.2)
BASOPHILS NFR BLD AUTO: 0.5 % — SIGNIFICANT CHANGE UP (ref 0–1)
BILIRUB SERPL-MCNC: 1 MG/DL — SIGNIFICANT CHANGE UP (ref 0.2–1.2)
BUN SERPL-MCNC: 24 MG/DL — HIGH (ref 10–20)
CALCIUM SERPL-MCNC: 9.4 MG/DL — SIGNIFICANT CHANGE UP (ref 8.5–10.1)
CHLORIDE SERPL-SCNC: 100 MMOL/L — SIGNIFICANT CHANGE UP (ref 98–110)
CO2 SERPL-SCNC: 29 MMOL/L — SIGNIFICANT CHANGE UP (ref 17–32)
CREAT SERPL-MCNC: 1.6 MG/DL — HIGH (ref 0.7–1.5)
EOSINOPHIL # BLD AUTO: 0.09 K/UL — SIGNIFICANT CHANGE UP (ref 0–0.7)
EOSINOPHIL NFR BLD AUTO: 0.9 % — SIGNIFICANT CHANGE UP (ref 0–8)
GLUCOSE SERPL-MCNC: 96 MG/DL — SIGNIFICANT CHANGE UP (ref 70–99)
HCT VFR BLD CALC: 46.6 % — SIGNIFICANT CHANGE UP (ref 42–52)
HGB BLD-MCNC: 14.9 G/DL — SIGNIFICANT CHANGE UP (ref 14–18)
IMM GRANULOCYTES NFR BLD AUTO: 0.7 % — HIGH (ref 0.1–0.3)
LYMPHOCYTES # BLD AUTO: 1.82 K/UL — SIGNIFICANT CHANGE UP (ref 1.2–3.4)
LYMPHOCYTES # BLD AUTO: 17.4 % — LOW (ref 20.5–51.1)
MCHC RBC-ENTMCNC: 27.3 PG — SIGNIFICANT CHANGE UP (ref 27–31)
MCHC RBC-ENTMCNC: 32 G/DL — SIGNIFICANT CHANGE UP (ref 32–37)
MCV RBC AUTO: 85.5 FL — SIGNIFICANT CHANGE UP (ref 80–94)
MONOCYTES # BLD AUTO: 0.71 K/UL — HIGH (ref 0.1–0.6)
MONOCYTES NFR BLD AUTO: 6.8 % — SIGNIFICANT CHANGE UP (ref 1.7–9.3)
NEUTROPHILS # BLD AUTO: 7.74 K/UL — HIGH (ref 1.4–6.5)
NEUTROPHILS NFR BLD AUTO: 73.7 % — SIGNIFICANT CHANGE UP (ref 42.2–75.2)
NRBC # BLD: 0 /100 WBCS — SIGNIFICANT CHANGE UP (ref 0–0)
PLATELET # BLD AUTO: 236 K/UL — SIGNIFICANT CHANGE UP (ref 130–400)
POTASSIUM SERPL-MCNC: 3.8 MMOL/L — SIGNIFICANT CHANGE UP (ref 3.5–5)
POTASSIUM SERPL-SCNC: 3.8 MMOL/L — SIGNIFICANT CHANGE UP (ref 3.5–5)
PROT SERPL-MCNC: 7.2 G/DL — SIGNIFICANT CHANGE UP (ref 6–8)
RBC # BLD: 5.45 M/UL — SIGNIFICANT CHANGE UP (ref 4.7–6.1)
RBC # FLD: 13.9 % — SIGNIFICANT CHANGE UP (ref 11.5–14.5)
SODIUM SERPL-SCNC: 143 MMOL/L — SIGNIFICANT CHANGE UP (ref 135–146)
WBC # BLD: 10.48 K/UL — SIGNIFICANT CHANGE UP (ref 4.8–10.8)
WBC # FLD AUTO: 10.48 K/UL — SIGNIFICANT CHANGE UP (ref 4.8–10.8)

## 2021-04-27 PROCEDURE — 93010 ELECTROCARDIOGRAM REPORT: CPT

## 2021-04-27 PROCEDURE — 71046 X-RAY EXAM CHEST 2 VIEWS: CPT | Mod: 26

## 2021-04-27 NOTE — H&P PST ADULT - REASON FOR ADMISSION
PT PRESENTS TO PAST WITH NO SOB, CP, PALPITATIONS, DYSURIA, UTI OR URI AT PRESENT.   PT ABLE TO WALK UP 2-3 FLIGHTS OF STEPS WITH NO SOB.  AS PER THE PT, THIS IS HIS/HER COMPLETE MEDICAL AND SURGICAL HX, INCLUDING MEDICATIONS PRESCRIBED AND OVER THE COUNTER  pt denies any covid s/s, or tested positive in the past  pt advised self quarantine till day of procedure  denies travel outside the [place] in the past 30 days  Anesthesia Alert  NO--Difficult Airway  NO--History of neck surgery or radiation  NO--Limited ROM of neck  NO--History of Malignant hyperthermia  NO--Personal or family history of Pseudocholinesterase deficiency  NO--Prior Anesthesia Complication  NO--Latex Allergy  NO--Loose teeth  NO--History of Rheumatoid Arthritis  YES --THELMA  NO CPAP MACHINE   NO--Other_____  PT SCHEDULED FOR EXCISION OF THE HEAD CYST AND EXCISION CYST OF THE 3RD FINGER.   PT REPORTS-  I HAVE A CYST ON MY HEAD--FOR 17 YRS.  I HAD IT REMOVED AND IT CAME BACK 1 YEAR AGO.  I ALSO HAVE A CYST/ WART ON MY 3RD FINGER--I HAD IT FOR 17 YRS.

## 2021-04-27 NOTE — H&P PST ADULT - ADDITIONAL PE
CYST ON HEAD-- NON TENDER NO DRAINAGE--CELINA SIZE .5 IN X .5 IN.   CYST ON 3RD  FINGER-- LEFT HAND  NON TENDER NO DRAINAGE--CELINA SIZE .5 IN  X .5 IN.

## 2021-04-27 NOTE — H&P PST ADULT - NSICDXPASTMEDICALHX_GEN_ALL_CORE_FT
PAST MEDICAL HISTORY:  COPD (chronic obstructive pulmonary disease)     High cholesterol     HTN (hypertension)     Kidney cancer, primary, with metastasis from kidney to other site LUNG    THELMA (obstructive sleep apnea) NOT USING CPAP MACHINE

## 2021-05-10 ENCOUNTER — OUTPATIENT (OUTPATIENT)
Dept: OUTPATIENT SERVICES | Facility: HOSPITAL | Age: 66
LOS: 1 days | Discharge: HOME | End: 2021-05-10

## 2021-05-10 DIAGNOSIS — Z90.5 ACQUIRED ABSENCE OF KIDNEY: Chronic | ICD-10-CM

## 2021-05-10 DIAGNOSIS — Z95.0 PRESENCE OF CARDIAC PACEMAKER: Chronic | ICD-10-CM

## 2021-05-10 DIAGNOSIS — Z98.890 OTHER SPECIFIED POSTPROCEDURAL STATES: Chronic | ICD-10-CM

## 2021-05-10 DIAGNOSIS — Z11.59 ENCOUNTER FOR SCREENING FOR OTHER VIRAL DISEASES: ICD-10-CM

## 2021-05-10 PROBLEM — C64.9 MALIGNANT NEOPLASM OF UNSPECIFIED KIDNEY, EXCEPT RENAL PELVIS: Chronic | Status: ACTIVE | Noted: 2019-12-27

## 2021-05-10 PROBLEM — J44.9 CHRONIC OBSTRUCTIVE PULMONARY DISEASE, UNSPECIFIED: Chronic | Status: ACTIVE | Noted: 2021-04-27

## 2021-05-10 PROBLEM — G47.33 OBSTRUCTIVE SLEEP APNEA (ADULT) (PEDIATRIC): Chronic | Status: ACTIVE | Noted: 2021-04-27

## 2021-05-13 ENCOUNTER — RESULT REVIEW (OUTPATIENT)
Age: 66
End: 2021-05-13

## 2021-05-13 ENCOUNTER — OUTPATIENT (OUTPATIENT)
Dept: OUTPATIENT SERVICES | Facility: HOSPITAL | Age: 66
LOS: 1 days | Discharge: HOME | End: 2021-05-13
Payer: COMMERCIAL

## 2021-05-13 VITALS
DIASTOLIC BLOOD PRESSURE: 60 MMHG | TEMPERATURE: 98 F | WEIGHT: 246.92 LBS | RESPIRATION RATE: 18 BRPM | HEIGHT: 69 IN | OXYGEN SATURATION: 95 % | HEART RATE: 67 BPM | SYSTOLIC BLOOD PRESSURE: 124 MMHG

## 2021-05-13 VITALS
HEART RATE: 65 BPM | OXYGEN SATURATION: 98 % | RESPIRATION RATE: 18 BRPM | DIASTOLIC BLOOD PRESSURE: 69 MMHG | SYSTOLIC BLOOD PRESSURE: 119 MMHG

## 2021-05-13 DIAGNOSIS — Z95.0 PRESENCE OF CARDIAC PACEMAKER: Chronic | ICD-10-CM

## 2021-05-13 DIAGNOSIS — Z90.5 ACQUIRED ABSENCE OF KIDNEY: Chronic | ICD-10-CM

## 2021-05-13 DIAGNOSIS — Z98.890 OTHER SPECIFIED POSTPROCEDURAL STATES: Chronic | ICD-10-CM

## 2021-05-13 PROCEDURE — 88305 TISSUE EXAM BY PATHOLOGIST: CPT | Mod: 26

## 2021-05-13 PROCEDURE — 88304 TISSUE EXAM BY PATHOLOGIST: CPT | Mod: 26

## 2021-05-13 RX ORDER — FAMOTIDINE 10 MG/ML
1 INJECTION INTRAVENOUS
Qty: 0 | Refills: 0 | DISCHARGE

## 2021-05-13 RX ORDER — OXYCODONE AND ACETAMINOPHEN 5; 325 MG/1; MG/1
1 TABLET ORAL
Qty: 10 | Refills: 0
Start: 2021-05-13

## 2021-05-13 RX ORDER — SIMVASTATIN 20 MG/1
1 TABLET, FILM COATED ORAL
Qty: 0 | Refills: 0 | DISCHARGE

## 2021-05-13 RX ORDER — LOSARTAN POTASSIUM 100 MG/1
1 TABLET, FILM COATED ORAL
Qty: 0 | Refills: 0 | DISCHARGE

## 2021-05-13 RX ORDER — SODIUM CHLORIDE 9 MG/ML
1000 INJECTION, SOLUTION INTRAVENOUS
Refills: 0 | Status: DISCONTINUED | OUTPATIENT
Start: 2021-05-13 | End: 2021-05-13

## 2021-05-13 RX ORDER — ATENOLOL 25 MG/1
1 TABLET ORAL
Qty: 0 | Refills: 0 | DISCHARGE

## 2021-05-13 RX ORDER — ONDANSETRON 8 MG/1
4 TABLET, FILM COATED ORAL ONCE
Refills: 0 | Status: DISCONTINUED | OUTPATIENT
Start: 2021-05-13 | End: 2021-05-13

## 2021-05-13 RX ORDER — PARICALCITOL 2 UG/1
1 CAPSULE, GELATIN COATED ORAL
Qty: 0 | Refills: 0 | DISCHARGE

## 2021-05-13 RX ORDER — HYDROMORPHONE HYDROCHLORIDE 2 MG/ML
0.5 INJECTION INTRAMUSCULAR; INTRAVENOUS; SUBCUTANEOUS
Refills: 0 | Status: DISCONTINUED | OUTPATIENT
Start: 2021-05-13 | End: 2021-05-13

## 2021-05-13 RX ORDER — CHLORTHALIDONE 50 MG
1 TABLET ORAL
Qty: 0 | Refills: 0 | DISCHARGE

## 2021-05-13 RX ADMIN — SODIUM CHLORIDE 75 MILLILITER(S): 9 INJECTION, SOLUTION INTRAVENOUS at 10:57

## 2021-05-13 NOTE — BRIEF OPERATIVE NOTE - NSICDXBRIEFPROCEDURE_GEN_ALL_CORE_FT
PROCEDURES:  Excision of benign skin lesion of scalp, 2.1 cm to 3.0 cm 13-May-2021 10:22:00  Arleth Velasquez  Excision of lesion of middle finger 13-May-2021 10:22:59 left Arleth Velasquez

## 2021-05-13 NOTE — ASU PREOP CHECKLIST - HAND OFF
Unable to get pt. 3 attempts.      [facility]  09/12/20 5295
yes
BP thus far stable, but given sepsis will hold losartan for now   vitals q4h   resume as appropriate

## 2021-05-13 NOTE — BRIEF OPERATIVE NOTE - NSICDXBRIEFPREOP_GEN_ALL_CORE_FT
PRE-OP DIAGNOSIS:  Scalp cyst 13-May-2021 10:20:29  Arleth Velasquez  Mass of finger of left hand 13-May-2021 10:21:10  Arleth Velasquez

## 2021-05-13 NOTE — ASU DISCHARGE PLAN (ADULT/PEDIATRIC) - CARE PROVIDER_API CALL
Curry Adames  SURGERY  [address] [place] [address]  Phone: [phone]  Fax: [phone]  Follow Up Time:

## 2021-05-13 NOTE — ASU DISCHARGE PLAN (ADULT/PEDIATRIC) - ASU DC SPECIAL INSTRUCTIONSFT
You are being discharged from [facility]. Please schedule a follow up appointment with Dr. Adames for next Tuesday. You can take tylenol around the clock for mild pain. You have been prescribed percocet to take for moderate to severe pain; please take as directed. Narcotics can cause constipation; please take stool softeners as needed when you take percocet. Your dressing will be removed in clinic.  If you have any further questions about your care, please do not hesitate to contact Dr. Adames's clinic.

## 2021-05-13 NOTE — CHART NOTE - NSCHARTNOTEFT_GEN_A_CORE
PACU ANESTHESIA ADMISSION NOTE      Procedure: Excision of benign skin lesion of scalp, 2.1 cm to 3.0 cm    Excision of lesion of middle finger  left      Post op diagnosis:      ____  Intubated  TV:______       Rate: ______      FiO2: ______    ___x_  Patent Airway    ____x  Full return of protective reflexes    __x__  Full recovery from anesthesia / back to baseline status    Vitals:  T(C): 97.8  HR: 64  BP: 91/54  RR: 18  SpO2: 92    Mental Status:  _x___ Awake   _____ Alert   _____ Drowsy   _____ Sedated    Nausea/Vomiting:  __x__ NO  ______Yes,   See Post - Op Orders          Pain Scale (0-10):  __0___    Treatment: ____ None    ___x_ See Post - Op/PCA Orders    Post - Operative Fluids:   ____ Oral   ____ See Post - Op Orders    Plan: Discharge:   __x__Home       _____Floor     _____Critical Care    _____  Other:_________________    Comments:

## 2021-05-13 NOTE — BRIEF OPERATIVE NOTE - SPECIMENS
Scalp lesion, left middle finger ungual lesion [de-identified] : Verbal consent was obtained and all questions, comments and concerns were addressed right buttock was cleansed with alcohol prep X 3, ethyl chloride was used as local anesthetic. Under sterile precautions 30mg of Ketorolac and 40mg Depo-Medrol were instilled into the right buttock under sterile precautions. Patient tolerated procedure well. Dry sterile dressing was placed and patient described relief of pain 5 minutes after procedure was performed.

## 2021-05-13 NOTE — ASU PATIENT PROFILE, ADULT - PMH
COPD (chronic obstructive pulmonary disease)    High cholesterol    HTN (hypertension)    Kidney cancer, primary, with metastasis from kidney to other site  LUNG  THELMA (obstructive sleep apnea)  NOT USING CPAP MACHINE

## 2021-05-18 LAB — SURGICAL PATHOLOGY STUDY: SIGNIFICANT CHANGE UP

## 2021-05-25 DIAGNOSIS — L98.9 DISORDER OF THE SKIN AND SUBCUTANEOUS TISSUE, UNSPECIFIED: ICD-10-CM

## 2021-05-25 DIAGNOSIS — E78.00 PURE HYPERCHOLESTEROLEMIA, UNSPECIFIED: ICD-10-CM

## 2021-05-25 DIAGNOSIS — G47.33 OBSTRUCTIVE SLEEP APNEA (ADULT) (PEDIATRIC): ICD-10-CM

## 2021-05-25 DIAGNOSIS — I10 ESSENTIAL (PRIMARY) HYPERTENSION: ICD-10-CM

## 2021-05-25 DIAGNOSIS — Z87.891 PERSONAL HISTORY OF NICOTINE DEPENDENCE: ICD-10-CM

## 2021-05-25 DIAGNOSIS — J44.9 CHRONIC OBSTRUCTIVE PULMONARY DISEASE, UNSPECIFIED: ICD-10-CM

## 2021-05-25 DIAGNOSIS — D22.4 MELANOCYTIC NEVI OF SCALP AND NECK: ICD-10-CM

## 2022-02-28 NOTE — ED PROVIDER NOTE - PMH
High cholesterol    HTN (hypertension)    Kidney cancer, primary, with metastasis from kidney to other site    
sinus tachycardia

## 2022-09-01 NOTE — ED PROVIDER NOTE - NS ED MD DISPO DISCHARGE CCDA
Patient interviewed and examined.       Chief Complaint   Patient presents with   • Mass         HPI: 44 yo f, Montserratian speaking w  usec, DM, w long standing mass to midline of thoracic back, now w pain for last three days. Having episodic severe fleeting pain ro L back lateral to the mass and wraps to the front. No assoc fever. No chest pain or sob. No concerning signs or symptoms including changes in urinary or bowel symptoms such as incontience, weakness. Patient with no complaints of perineal paresthesias. Pt able to ambulate, but with some discomfort.   Went to [place] 6 mo ago and referred to derm for same    Per chart review she had a CT scan in 2022 at [facility] but they do not comment on the mass    Review of Systems  Constitutional symptoms:  No fever, chills.    Skin symptoms:  No jaundice, or rash    HEENT: no sore throat, no vision change  Respiratory symptoms:  No shortness of breath or cough  Cardiovascular symptoms:  No chest pain or palpitations  Gastrointestinal symptoms:  no vomiting, no diarrhea.    Genitourinary symptoms:  No dysuria or blood in urine  Musculoskeletal symptoms:  No joint swelling    Neurologic symptoms:  No headache or new weakness  Hematologic: no bleeding or new bruising    PAST MEDICAL HX:    Diabetes (CMS/HCC)                                            DM (diabetes mellitus) (CMS/HCC)                                PAST SURGICAL HX:     SECTION, LOW TRANSVERSE                              APPENDECTOMY                                                  BLADDER SUSPENSION                                             Family History   Problem Relation Age of Onset   • Hypertension Mother    • Patient is unaware of any medical problems Father        Social History     Tobacco Use   • Smoking status: Never Smoker   • Smokeless tobacco: Never Used   Substance Use Topics   • Alcohol use: Not Currently   • Drug use: Never          PE    Vitals:    22  2151 08/31/22 2354 09/01/22 0230   BP: 130/89 126/70 120/72   Pulse: 94 84 89   Resp: 18 16 16   Temp: 97.2 °F (36.2 °C)     TempSrc: Tympanic     SpO2: 97% 96% 98%   LMP: 08/01/2022       General: lying comfortably, no acute distress  HEENT: NC/AT, EOMI, oral mucosa pink and moist  Neck: supple, no sp tenderness, no nuchal rigidity  Heart: regular rate and rhythm  Lungs: Nonlabored, clear to auscultation, no wheezing noted  ABD: soft, no guarding, rebound, or peritoneal signs or symptoms.   nontender  Musculo: Normal ROM, strength and sensation, firm, rubbery and mobile mass midline spine, under bra  Mass palm sized, soft  Skin: No rash, warm and well perfused  Neuro: Alert and oriented, no focal neuro deficits  Psych: Normal affect          MDM:    Attempted to get old records including labs, imaging and ekg.   The case was reviewed with the patient. Nursing notes reviewed.  Differential includes an lipoma or epidermal inclusion cyst, or other mass.  I am worried that the size of something is over 4 cm.  I think it needs some additional imaging.  And I think she has had anything to characterize its architecture.  Unfortunately the CT that she has does not really describe it.  Probably better since his midline to get an MRI especially because she is having some paresthesias with it.  She has no weakness to indicate any emergent imaging.  She is really minimal tenderness with no signs of secondary bacterial infection.  Rebecca try a lidocaine patch and Tylenol and also recommended to not wear this compressive bra looks like its really pushing on it.  We are troubleshooting ways that she can address with being uninsured.  Told her to buy financial aid for a little masonic.  She can also apply for a medical card.  She goes to low-cost health care clinic in the meantime additional resources.  [facility] could be an option.    I have performed an independent history and physical examination and discussed the patient's  management with the resident. I agree with the findings, assessment and plan of care as documented by the resident except as noted above. Any EKG or XRAY studies performed during the patient's Emergency Department stay were ordered and reviewed directly under my supervision and noted by the resident.      No results found for this visit on 08/31/22.     Imaging Results    None           Medications   lidocaine (LIDOCARE) 4 % patch 1 patch (1 patch Transdermal Patch Applied 9/1/22 0119)   acetaminophen (TYLENOL) tablet 650 mg (650 mg Oral Given 9/1/22 0119)             ED Diagnoses     Diagnosis Comment Associated Orders       Final diagnoses    Acute left-sided thoracic back pain -- --    Lipoma of torso -- --           [facility] [place] [id number] [facility] [id number]  Schedule an appointment as soon as possible for a visit in 2 days  Please follow-up with your PCP.  If you do not have one please follow-up with the clinic listed above.    Gumaro Suárez MD  [address]  [place] [address] [phone]    Schedule an appointment as soon as possible for a visit in 2 days  Please follow up with general surgery, Dr. Naik within 24-72 hours of discharge.      MD La Amin MD  09/01/22 0326     Patient/Caregiver provided printed discharge information.

## 2022-09-12 NOTE — ASU PATIENT PROFILE, ADULT - REASON FOR ADMISSION, PROFILE
cyst removal Olumiant Counseling: I discussed with the patient the risks of Olumiant therapy including but not limited to upper respiratory tract infections, shingles, cold sores, and nausea. Live vaccines should be avoided.  This medication has been linked to serious infections; higher rate of mortality; malignancy and lymphoproliferative disorders; major adverse cardiovascular events; thrombosis; gastrointestinal perforations; neutropenia; lymphopenia; anemia; liver enzyme elevations; and lipid elevations.

## 2024-03-06 ENCOUNTER — INPATIENT (INPATIENT)
Facility: HOSPITAL | Age: 69
LOS: 1 days | Discharge: HOME | DRG: 54 | End: 2024-03-08
Attending: INTERNAL MEDICINE | Admitting: STUDENT IN AN ORGANIZED HEALTH CARE EDUCATION/TRAINING PROGRAM
Payer: MEDICARE

## 2024-03-06 VITALS
HEART RATE: 88 BPM | TEMPERATURE: 98 F | DIASTOLIC BLOOD PRESSURE: 91 MMHG | SYSTOLIC BLOOD PRESSURE: 135 MMHG | RESPIRATION RATE: 18 BRPM | WEIGHT: 242.07 LBS | OXYGEN SATURATION: 94 % | HEIGHT: 69 IN

## 2024-03-06 DIAGNOSIS — Z98.890 OTHER SPECIFIED POSTPROCEDURAL STATES: Chronic | ICD-10-CM

## 2024-03-06 DIAGNOSIS — C79.31 SECONDARY MALIGNANT NEOPLASM OF BRAIN: ICD-10-CM

## 2024-03-06 DIAGNOSIS — Z95.0 PRESENCE OF CARDIAC PACEMAKER: Chronic | ICD-10-CM

## 2024-03-06 DIAGNOSIS — Z90.5 ACQUIRED ABSENCE OF KIDNEY: Chronic | ICD-10-CM

## 2024-03-06 LAB
ALBUMIN SERPL ELPH-MCNC: 4.3 G/DL — SIGNIFICANT CHANGE UP (ref 3.5–5.2)
ALP SERPL-CCNC: 110 U/L — SIGNIFICANT CHANGE UP (ref 30–115)
ALT FLD-CCNC: 16 U/L — SIGNIFICANT CHANGE UP (ref 0–41)
ANION GAP SERPL CALC-SCNC: 12 MMOL/L — SIGNIFICANT CHANGE UP (ref 7–14)
APTT BLD: 32.7 SEC — SIGNIFICANT CHANGE UP (ref 27–39.2)
AST SERPL-CCNC: 13 U/L — SIGNIFICANT CHANGE UP (ref 0–41)
BASOPHILS # BLD AUTO: 0.04 K/UL — SIGNIFICANT CHANGE UP (ref 0–0.2)
BASOPHILS NFR BLD AUTO: 0.4 % — SIGNIFICANT CHANGE UP (ref 0–1)
BILIRUB SERPL-MCNC: 1.1 MG/DL — SIGNIFICANT CHANGE UP (ref 0.2–1.2)
BUN SERPL-MCNC: 21 MG/DL — HIGH (ref 10–20)
CALCIUM SERPL-MCNC: 9.7 MG/DL — SIGNIFICANT CHANGE UP (ref 8.4–10.4)
CHLORIDE SERPL-SCNC: 99 MMOL/L — SIGNIFICANT CHANGE UP (ref 98–110)
CO2 SERPL-SCNC: 29 MMOL/L — SIGNIFICANT CHANGE UP (ref 17–32)
CREAT SERPL-MCNC: 1.4 MG/DL — SIGNIFICANT CHANGE UP (ref 0.7–1.5)
EGFR: 55 ML/MIN/1.73M2 — LOW
EOSINOPHIL # BLD AUTO: 0.05 K/UL — SIGNIFICANT CHANGE UP (ref 0–0.7)
EOSINOPHIL NFR BLD AUTO: 0.5 % — SIGNIFICANT CHANGE UP (ref 0–8)
GLUCOSE SERPL-MCNC: 141 MG/DL — HIGH (ref 70–99)
HCT VFR BLD CALC: 46.9 % — SIGNIFICANT CHANGE UP (ref 42–52)
HGB BLD-MCNC: 15.5 G/DL — SIGNIFICANT CHANGE UP (ref 14–18)
IMM GRANULOCYTES NFR BLD AUTO: 0.6 % — HIGH (ref 0.1–0.3)
INR BLD: 0.99 RATIO — SIGNIFICANT CHANGE UP (ref 0.65–1.3)
LYMPHOCYTES # BLD AUTO: 1.43 K/UL — SIGNIFICANT CHANGE UP (ref 1.2–3.4)
LYMPHOCYTES # BLD AUTO: 14.4 % — LOW (ref 20.5–51.1)
MCHC RBC-ENTMCNC: 28.1 PG — SIGNIFICANT CHANGE UP (ref 27–31)
MCHC RBC-ENTMCNC: 33 G/DL — SIGNIFICANT CHANGE UP (ref 32–37)
MCV RBC AUTO: 85 FL — SIGNIFICANT CHANGE UP (ref 80–94)
MONOCYTES # BLD AUTO: 0.59 K/UL — SIGNIFICANT CHANGE UP (ref 0.1–0.6)
MONOCYTES NFR BLD AUTO: 5.9 % — SIGNIFICANT CHANGE UP (ref 1.7–9.3)
NEUTROPHILS # BLD AUTO: 7.76 K/UL — HIGH (ref 1.4–6.5)
NEUTROPHILS NFR BLD AUTO: 78.2 % — HIGH (ref 42.2–75.2)
NRBC # BLD: 0 /100 WBCS — SIGNIFICANT CHANGE UP (ref 0–0)
PLATELET # BLD AUTO: 221 K/UL — SIGNIFICANT CHANGE UP (ref 130–400)
PMV BLD: 11.3 FL — HIGH (ref 7.4–10.4)
POTASSIUM SERPL-MCNC: 3.7 MMOL/L — SIGNIFICANT CHANGE UP (ref 3.5–5)
POTASSIUM SERPL-SCNC: 3.7 MMOL/L — SIGNIFICANT CHANGE UP (ref 3.5–5)
PROT SERPL-MCNC: 6.8 G/DL — SIGNIFICANT CHANGE UP (ref 6–8)
PROTHROM AB SERPL-ACNC: 11.3 SEC — SIGNIFICANT CHANGE UP (ref 9.95–12.87)
RBC # BLD: 5.52 M/UL — SIGNIFICANT CHANGE UP (ref 4.7–6.1)
RBC # FLD: 13.4 % — SIGNIFICANT CHANGE UP (ref 11.5–14.5)
SALICYLATES SERPL-MCNC: 1.4 MG/DL — LOW (ref 4–30)
SODIUM SERPL-SCNC: 140 MMOL/L — SIGNIFICANT CHANGE UP (ref 135–146)
TROPONIN T, HIGH SENSITIVITY RESULT: 18 NG/L — SIGNIFICANT CHANGE UP (ref 6–21)
WBC # BLD: 9.93 K/UL — SIGNIFICANT CHANGE UP (ref 4.8–10.8)
WBC # FLD AUTO: 9.93 K/UL — SIGNIFICANT CHANGE UP (ref 4.8–10.8)

## 2024-03-06 PROCEDURE — 83735 ASSAY OF MAGNESIUM: CPT

## 2024-03-06 PROCEDURE — 70450 CT HEAD/BRAIN W/O DYE: CPT | Mod: 26,MC,59

## 2024-03-06 PROCEDURE — 99285 EMERGENCY DEPT VISIT HI MDM: CPT | Mod: FS

## 2024-03-06 PROCEDURE — 86803 HEPATITIS C AB TEST: CPT

## 2024-03-06 PROCEDURE — 70496 CT ANGIOGRAPHY HEAD: CPT | Mod: 26,MC

## 2024-03-06 PROCEDURE — 99223 1ST HOSP IP/OBS HIGH 75: CPT

## 2024-03-06 PROCEDURE — 36415 COLL VENOUS BLD VENIPUNCTURE: CPT

## 2024-03-06 PROCEDURE — 85027 COMPLETE CBC AUTOMATED: CPT

## 2024-03-06 PROCEDURE — 71045 X-RAY EXAM CHEST 1 VIEW: CPT | Mod: 26

## 2024-03-06 PROCEDURE — 70498 CT ANGIOGRAPHY NECK: CPT | Mod: 26,MC

## 2024-03-06 PROCEDURE — 80048 BASIC METABOLIC PNL TOTAL CA: CPT

## 2024-03-06 PROCEDURE — 95819 EEG AWAKE AND ASLEEP: CPT

## 2024-03-06 PROCEDURE — 80053 COMPREHEN METABOLIC PANEL: CPT

## 2024-03-06 RX ORDER — ATENOLOL 25 MG/1
25 TABLET ORAL DAILY
Refills: 0 | Status: DISCONTINUED | OUTPATIENT
Start: 2024-03-06 | End: 2024-03-07

## 2024-03-06 RX ORDER — DEXAMETHASONE 0.5 MG/5ML
10 ELIXIR ORAL ONCE
Refills: 0 | Status: COMPLETED | OUTPATIENT
Start: 2024-03-06 | End: 2024-03-06

## 2024-03-06 RX ORDER — FAMOTIDINE 10 MG/ML
40 INJECTION INTRAVENOUS DAILY
Refills: 0 | Status: DISCONTINUED | OUTPATIENT
Start: 2024-03-06 | End: 2024-03-08

## 2024-03-06 RX ORDER — DEXAMETHASONE 0.5 MG/5ML
4 ELIXIR ORAL EVERY 6 HOURS
Refills: 0 | Status: DISCONTINUED | OUTPATIENT
Start: 2024-03-06 | End: 2024-03-08

## 2024-03-06 RX ORDER — INFLUENZA VIRUS VACCINE 15; 15; 15; 15 UG/.5ML; UG/.5ML; UG/.5ML; UG/.5ML
0.7 SUSPENSION INTRAMUSCULAR ONCE
Refills: 0 | Status: DISCONTINUED | OUTPATIENT
Start: 2024-03-06 | End: 2024-03-08

## 2024-03-06 RX ORDER — ATORVASTATIN CALCIUM 80 MG/1
40 TABLET, FILM COATED ORAL AT BEDTIME
Refills: 0 | Status: DISCONTINUED | OUTPATIENT
Start: 2024-03-06 | End: 2024-03-07

## 2024-03-06 RX ORDER — LEVETIRACETAM 250 MG/1
1000 TABLET, FILM COATED ORAL ONCE
Refills: 0 | Status: COMPLETED | OUTPATIENT
Start: 2024-03-06 | End: 2024-03-06

## 2024-03-06 RX ORDER — LOSARTAN POTASSIUM 100 MG/1
50 TABLET, FILM COATED ORAL DAILY
Refills: 0 | Status: DISCONTINUED | OUTPATIENT
Start: 2024-03-06 | End: 2024-03-07

## 2024-03-06 RX ADMIN — Medication 102 MILLIGRAM(S): at 18:25

## 2024-03-06 RX ADMIN — Medication 4 MILLIGRAM(S): at 21:38

## 2024-03-06 RX ADMIN — LEVETIRACETAM 400 MILLIGRAM(S): 250 TABLET, FILM COATED ORAL at 20:39

## 2024-03-06 NOTE — ED PROVIDER NOTE - PHYSICAL EXAMINATION
CONST: NAD  EYES: PERRL, EOMI, Sclera and conjunctiva clear.   ENT: No nasal discharge. Oropharynx normal appearing.   NECK: Non-tender, no meningeal signs. normal ROM. supple   CARD: S1 S2; No jvd  RESP: Equal BS B/L, No wheezes, rhonchi or rales. No distress  GI: Soft, non-tender, non-distended. normal BS  MS: Normal ROM in all extremities. pulses 2 +. no calf tenderness or swelling  SKIN: Warm, dry, no acute rashes. Good turgor  NEURO: Episodic aphasia. A&Ox3. Strength 5/5 with no sensory deficits. Steady gait. Finger to nose intact. Negative pronator drift

## 2024-03-06 NOTE — ED ADULT NURSE REASSESSMENT NOTE - NS ED NURSE REASSESS COMMENT FT1
Pt received from day shift RN. Pt AOx3 but aphasic with intermittent inappropriate speech. No acute distress noted. No complaints at this time. Comfort and safety measures maintained

## 2024-03-06 NOTE — H&P ADULT - NSHPLABSRESULTS_GEN_ALL_CORE
<<<<<LABS>>>>>                        15.5   9.93  )-----------( 221      ( 06 Mar 2024 16:30 )             46.9     03-06    140  |  99  |  21<H>  ----------------------------<  141<H>  3.7   |  29  |  1.4    Ca    9.7      06 Mar 2024 16:30    TPro  6.8  /  Alb  4.3  /  TBili  1.1  /  DBili  x   /  AST  13  /  ALT  16  /  AlkPhos  110  03-06    PT/INR - ( 06 Mar 2024 16:30 )   PT: 11.30 sec;   INR: 0.99 ratio         PTT - ( 06 Mar 2024 16:30 )  PTT:32.7 sec  Urinalysis Basic - ( 06 Mar 2024 16:30 )    Color: x / Appearance: x / SG: x / pH: x  Gluc: 141 mg/dL / Ketone: x  / Bili: x / Urobili: x   Blood: x / Protein: x / Nitrite: x   Leuk Esterase: x / RBC: x / WBC x   Sq Epi: x / Non Sq Epi: x / Bacteria: x          [id number]      <<<<<RADIOLOGY>>>>>      ----------------------------------------------------------------------------------------------------------------------------------------------------------------------------------------------- <<<<<LABS>>>>>                        15.5   9.93  )-----------( 221      ( 06 Mar 2024 16:30 )             46.9     03-06    140  |  99  |  21<H>  ----------------------------<  141<H>  3.7   |  29  |  1.4    Ca    9.7      06 Mar 2024 16:30    TPro  6.8  /  Alb  4.3  /  TBili  1.1  /  DBili  x   /  AST  13  /  ALT  16  /  AlkPhos  110  03-06    PT/INR - ( 06 Mar 2024 16:30 )   PT: 11.30 sec;   INR: 0.99 ratio         PTT - ( 06 Mar 2024 16:30 )  PTT:32.7 sec  Urinalysis Basic - ( 06 Mar 2024 16:30 )    Color: x / Appearance: x / SG: x / pH: x  Gluc: 141 mg/dL / Ketone: x  / Bili: x / Urobili: x   Blood: x / Protein: x / Nitrite: x   Leuk Esterase: x / RBC: x / WBC x   Sq Epi: x / Non Sq Epi: x / Bacteria: x          [id number]      <<<<<RADIOLOGY>>>>>  CT Head (3/6): Ill-defined lesion within the left temporal periventricular region with significant surrounding edema as well as mild local mass effect. Left suboccipital craniotomy postsurgical changes are noted with patchy heterogeneous density involving the underlying cerebellar hemisphere. An underlying mass lesion cannot be excluded. A dedicated contrast enhanced brain MRI is recommended for further evaluation.    CTA Head and Neck (3/6): No evidence of flow-limiting stenosis, occlusion or aneurysm. No evidence of carotid or vertebral artery stenosis. Numerous pulmonary nodules are noted concerning for metastases.    -----------------------------------------------------------------------------------------------------------------------------------------------------------------------------------------------

## 2024-03-06 NOTE — H&P ADULT - NSHPREVIEWOFSYSTEMS_GEN_ALL_CORE
REVIEW OF SYSTEMS:    CONSTITUTIONAL: No weakness, fevers or chills  EYES/ENT: No visual changes;  No vertigo or throat pain   NECK: No pain or stiffness  RESPIRATORY: No cough, wheezing, hemoptysis; No shortness of breath  CARDIOVASCULAR: No chest pain or palpitations  GASTROINTESTINAL: No abdominal or epigastric pain. No nausea, vomiting, or hematemesis; No diarrhea or constipation. No melena or hematochezia.  GENITOURINARY: No dysuria, frequency or hematuria  NEUROLOGICAL: No numbness or weakness  SKIN: No itching, rashes CONSTITUTIONAL: No weakness, fevers or chills  EYES/ENT: No visual changes;  No vertigo or throat pain   NECK: No pain or stiffness  RESPIRATORY: Endorses cough for last 3-4 weeks, denies wheezing or hemoptysis; No shortness of breath  CARDIOVASCULAR: No chest pain or palpitations  GASTROINTESTINAL: No abdominal or epigastric pain. No nausea, vomiting, or hematemesis; No diarrhea or constipation. No melena or hematochezia.  GENITOURINARY: No dysuria, frequency or hematuria  NEUROLOGICAL: See HPI  SKIN: No itching, rashes  ENDO: No heat or cold intolerance, no polyuria or polydipsia  HEME: No abnormal bruising or bleeding  PSYCH: No anxiety or depression

## 2024-03-06 NOTE — H&P ADULT - NSHPPHYSICALEXAM_GEN_ALL_CORE
GENERAL: Well developed, well nourished and in no acute distress. Resting comfortably in bed.  HEENT: Normocephalic, atraumatic, mucous membranes moist, EOMI, PERRLA, bilateral sclera anicteric, no conjunctival injection  Neck: Supple, non-tender, no lymphadenopathy.  PULMONARY: Clear to auscultation bilaterally. No rales, rhonchi, or wheezing.  CARDIOVASCULAR: Regular rate and rhythm, S1-S2, no murmurs  GASTROINTESTINAL: Soft, non-tender, non-distended, no guarding.  RENAL: No CVA tenderness.  SKIN/EXTREMITIES: No clubbing or edema  NEUROLOGIC/MUSCULOSKELETAL: AOx4, grossly moving all extremities, no focal deficits. GENERAL: Well developed, well nourished and in no acute distress. Resting comfortably in bed.  HEENT: Normocephalic, atraumatic, mucous membranes moist, EOMI, PERRLA, bilateral sclera anicteric, no conjunctival pallor, hearing intact bilaterally  NECK: Supple, non-tender, no lymphadenopathy, appears to have a fat pad on the back of his neck  PULMONARY: Was persistently coughing on ausculation, appears to have crackles bilaterally on ausculation  CARDIOVASCULAR: Regular rate and rhythm, S1-S2, no murmurs, rubs, or gallops  GASTROINTESTINAL: Soft, non-tender, non-distended, no guarding  SKIN: No rash, erythema, or open wounds  EXTREMITIES: Warm, 2+ dorsalis pedis pulses, no UE or LE edema  NEUROLOGIC/MUSCULOSKELETAL: AOx4, reading comprehension intact, speech impaired, able to follow a 3 step command, long term memory intact, short term memory intact, facial strength intact, facial sensation intact, 5/5 UE and LE strength, sensation intact to light touch, has some difficulty standing but is able to walk with normal gait GENERAL: Well developed, well nourished and in no acute distress. Resting comfortably in bed.  HEENT: Normocephalic, atraumatic, mucous membranes moist, EOMI, PERRLA, bilateral sclera anicteric, no conjunctival pallor, hearing intact bilaterally  NECK: Supple, non-tender, no lymphadenopathy, appears to have a fat pad on the back of his neck  PULMONARY: Was persistently coughing on ausculation, appears to have bilateral expiratory wheeze on auscultation  CARDIOVASCULAR: Regular rate and rhythm, S1-S2, no murmurs, rubs, or gallops  GASTROINTESTINAL: Soft, non-tender, non-distended, no guarding  SKIN: No rash, erythema, or open wounds  EXTREMITIES: Warm, 2+ dorsalis pedis pulses, no UE or LE edema  NEUROLOGIC/MUSCULOSKELETAL: AOx4, reading comprehension intact, speech impaired, able to follow a 3 step command, long term memory intact, short term memory intact, facial strength intact, facial sensation intact, 5/5 UE and LE strength, sensation intact to light touch, has some difficulty standing but is able to walk with normal gait

## 2024-03-06 NOTE — ED PROVIDER NOTE - WR ORDER ID 1
[id number] Niacinamide Counseling: I recommended taking niacin or niacinamide, also know as vitamin B3, twice daily. Recent evidence suggests that taking vitamin B3 (500 mg twice daily) can reduce the risk of actinic keratoses and non-melanoma skin cancers. Side effects of vitamin B3 include flushing and headache.

## 2024-03-06 NOTE — ED ADULT TRIAGE NOTE - CHIEF COMPLAINT QUOTE
Pt. c/o fogginess and aphasia starting Thursday night. Pt. sent in by PCP for head CT. Pt. denies dizziness. Pt. denies vision changes. Pt. denies chest pain/SOB.

## 2024-03-06 NOTE — CONSULT NOTE ADULT - SUBJECTIVE AND OBJECTIVE BOX
HPI: 68-year-old male past medical history of COPD, hyperlipidemia, hypertension, THELMA, kidney CA with mets to lung and brain status postresection presents for evaluation.  Patient endorses aphasia and stuttering since Thursday with no inciting relieving factors.  Associated agitation.  Denies fever, headache, vision change, neck pain, chest pain, shortness of breath, weakness, numbness, dysuria, hematuria, vomiting, diarrhea.  ---------------------------------------------------------------------------------------------  Pt seen and examined bedside in ER with family at bedside. Neurosurgery consulted for CTH demonstrating new lesion. Pt A&Ox3 aphasic and agitated, brother at bedside also providing history. Per brother pt has known renal cell ca with mets to brain and follows up at [facility] ([place]). Pt follows with Dr Alcaraz ([phone]) is s/p sub-occipital crani (2021) and 5 rounds of radiation. Per brother since 2/29 the pt has been aphasic and confusion, not previously noted. Pt denies any complaints at this time headaches, vision changes (double/blurry), weakness, numbness. Pt received scheduled MRI follows up at [facility] and imaging is relays to his provider [facility]. Pt would like to continue his care at [facility].         PAST MEDICAL & SURGICAL HISTORY:  HTN (hypertension)  Kidney cancer, primary, with metastasis from kidney to other site  LUNG  High cholesterol  COPD (chronic obstructive pulmonary disease)  THELMA (obstructive sleep apnea)  NOT USING CPAP MACHINE  History of nephrectomy  Pacemaker  H/O lithotripsy 1979        Home Medications:  atenolol 25 mg oral tablet: 1 tab(s) orally once a day (13 May 2021 08:41)  chlorthalidone 25 mg oral tablet: 1 tab(s) orally once a day (13 May 2021 08:41)  famotidine 40 mg oral tablet: 1 tab(s) orally once a day (at bedtime) (13 May 2021 08:41)  losartan 50 mg oral tablet: 1 tab(s) orally once a day (13 May 2021 08:41)  paricalcitol 1 mcg oral capsule: 1 cap(s) orally once a day (13 May 2021 08:41)  simvastatin 80 mg oral tablet: 1 tab(s) orally once a day (at bedtime) (13 May 2021 08:41)  Vitamin D3 50,000 intl units (1250 mcg) oral capsule:  (13 May 2021 08:41)      Allergies    No Known Allergies    Intolerances        ROS:  [ X ] A ten-point review of systems is negative except as noted   [  ] Due to altered mental status/intubation, subjective information were not able to be obtained from the patient. History was obtained, to the extent possible, from review of the chart and collateral sources of information    MEDICATIONS  (STANDING):    MEDICATIONS  (PRN):      ICU Vital Signs Last 24 Hrs  T(C): 36.6 (06 Mar 2024 16:50), Max: 36.8 (06 Mar 2024 14:16)  T(F): 97.8 (06 Mar 2024 16:50), Max: 98.2 (06 Mar 2024 14:16)  HR: 74 (06 Mar 2024 16:50) (74 - 88)  BP: 113/77 (06 Mar 2024 16:50) (113/77 - 135/91)  BP(mean): 81 (06 Mar 2024 16:50) (81 - 81)  ABP: --  ABP(mean): --  RR: 18 (06 Mar 2024 16:50) (18 - 18)  SpO2: 96% (06 Mar 2024 16:50) (94% - 96%)    O2 Parameters below as of 06 Mar 2024 16:50  Patient On (Oxygen Delivery Method): room air            I&O's Detail                            15.5   9.93  )-----------( 221      ( 06 Mar 2024 16:30 )             46.9     03-06    140  |  99  |  21<H>  ----------------------------<  141<H>  3.7   |  29  |  1.4    Ca    9.7      06 Mar 2024 16:30    TPro  6.8  /  Alb  4.3  /  TBili  1.1  /  DBili  x   /  AST  13  /  ALT  16  /  AlkPhos  110  03-06        Urinalysis Basic - ( 06 Mar 2024 16:30 )    Color: x / Appearance: x / SG: x / pH: x  Gluc: 141 mg/dL / Ketone: x  / Bili: x / Urobili: x   Blood: x / Protein: x / Nitrite: x   Leuk Esterase: x / RBC: x / WBC x   Sq Epi: x / Non Sq Epi: x / Bacteria: x        On PE:  A&Ox3; aphasic, intermittent inappropriate speech  PERRL  EOMI  No droop  tongue midline  No drift  Finger to nose intact  MENARD x4       Radiology:  < from: CT Head No Cont (03.06.24 @ 17:19) >  CT HEAD:  Ill-defined lesion within the left temporal periventricular region with   significant surrounding edema as well as mild local mass effect.    Left suboccipital craniotomy postsurgical changes are noted with patchy   heterogeneous density involving the underlying cerebellar hemisphere. An   underlying mass lesion cannot be excluded. A dedicated contrast enhanced   brain MRI is recommended for further evaluation.      < end of copied text >      Assessment:  68 M with known renal ca with mets to brain p/w new Ill-defined lesion within the left temporal periventricular region with   significant surrounding edema as well as mild local mass effect.    Plan:  - Steroids- Dex 4 q6  - AEDs: Keppra  - MRI Brain w/wo- *pt has PPM- must check MRI compatibility   - Neurochecks: routine  - Care per primary team  - D/W attending  HPI: 68-year-old male past medical history of COPD, hyperlipidemia, hypertension, THELMA, kidney CA with mets to lung and brain status postresection presents for evaluation.  Patient endorses aphasia and stuttering since Thursday with no inciting relieving factors.  Associated agitation.  Denies fever, headache, vision change, neck pain, chest pain, shortness of breath, weakness, numbness, dysuria, hematuria, vomiting, diarrhea.  ---------------------------------------------------------------------------------------------  Pt seen and examined bedside in ER with family at bedside. Neurosurgery consulted for CTH demonstrating new lesion. Pt A&Ox3 aphasic and agitated, brother at bedside also providing history. Per brother pt has known renal cell ca with mets to brain and follows up at [facility] ([place]). Pt follows with Dr Alcaraz ([phone]) is s/p sub-occipital crani (2021) and 5 rounds of radiation. Per brother since 2/29 the pt has been aphasic and confusion, not previously noted. Pt denies any complaints at this time headaches, vision changes (double/blurry), weakness, numbness. Pt received scheduled MRI follows up at [facility] and imaging is relays to his provider at [facility]. Pt would like to continue his care at [facility].         PAST MEDICAL & SURGICAL HISTORY:  HTN (hypertension)  Kidney cancer, primary, with metastasis from kidney to other site  LUNG  High cholesterol  COPD (chronic obstructive pulmonary disease)  THELMA (obstructive sleep apnea)  NOT USING CPAP MACHINE  History of nephrectomy  Pacemaker  H/O lithotripsy 1979        Home Medications:  atenolol 25 mg oral tablet: 1 tab(s) orally once a day (13 May 2021 08:41)  chlorthalidone 25 mg oral tablet: 1 tab(s) orally once a day (13 May 2021 08:41)  famotidine 40 mg oral tablet: 1 tab(s) orally once a day (at bedtime) (13 May 2021 08:41)  losartan 50 mg oral tablet: 1 tab(s) orally once a day (13 May 2021 08:41)  paricalcitol 1 mcg oral capsule: 1 cap(s) orally once a day (13 May 2021 08:41)  simvastatin 80 mg oral tablet: 1 tab(s) orally once a day (at bedtime) (13 May 2021 08:41)  Vitamin D3 50,000 intl units (1250 mcg) oral capsule:  (13 May 2021 08:41)      Allergies    No Known Allergies    Intolerances        ROS:  [ X ] A ten-point review of systems is negative except as noted   [  ] Due to altered mental status/intubation, subjective information were not able to be obtained from the patient. History was obtained, to the extent possible, from review of the chart and collateral sources of information    MEDICATIONS  (STANDING):    MEDICATIONS  (PRN):      ICU Vital Signs Last 24 Hrs  T(C): 36.6 (06 Mar 2024 16:50), Max: 36.8 (06 Mar 2024 14:16)  T(F): 97.8 (06 Mar 2024 16:50), Max: 98.2 (06 Mar 2024 14:16)  HR: 74 (06 Mar 2024 16:50) (74 - 88)  BP: 113/77 (06 Mar 2024 16:50) (113/77 - 135/91)  BP(mean): 81 (06 Mar 2024 16:50) (81 - 81)  ABP: --  ABP(mean): --  RR: 18 (06 Mar 2024 16:50) (18 - 18)  SpO2: 96% (06 Mar 2024 16:50) (94% - 96%)    O2 Parameters below as of 06 Mar 2024 16:50  Patient On (Oxygen Delivery Method): room air            I&O's Detail                            15.5   9.93  )-----------( 221      ( 06 Mar 2024 16:30 )             46.9     03-06    140  |  99  |  21<H>  ----------------------------<  141<H>  3.7   |  29  |  1.4    Ca    9.7      06 Mar 2024 16:30    TPro  6.8  /  Alb  4.3  /  TBili  1.1  /  DBili  x   /  AST  13  /  ALT  16  /  AlkPhos  110  03-06        Urinalysis Basic - ( 06 Mar 2024 16:30 )    Color: x / Appearance: x / SG: x / pH: x  Gluc: 141 mg/dL / Ketone: x  / Bili: x / Urobili: x   Blood: x / Protein: x / Nitrite: x   Leuk Esterase: x / RBC: x / WBC x   Sq Epi: x / Non Sq Epi: x / Bacteria: x        On PE:  A&Ox3; aphasic, intermittent inappropriate speech  PERRL  EOMI  No droop  tongue midline  No drift  Finger to nose intact  MENARD x4       Radiology:  < from: CT Head No Cont (03.06.24 @ 17:19) >  CT HEAD:  Ill-defined lesion within the left temporal periventricular region with   significant surrounding edema as well as mild local mass effect.    Left suboccipital craniotomy postsurgical changes are noted with patchy   heterogeneous density involving the underlying cerebellar hemisphere. An   underlying mass lesion cannot be excluded. A dedicated contrast enhanced   brain MRI is recommended for further evaluation.      < end of copied text >      Assessment:  68 M with known renal ca with mets to brain p/w new Ill-defined lesion within the left temporal periventricular region with   significant surrounding edema as well as mild local mass effect.    Plan:  - Steroids- Dex 4 q6  - AEDs: Keppra 1g q 12  - MRI Brain w/wo- *pt has PPM- must check MRI compatibility   - Neurochecks: routine  - Consider neurology consult   - Care per primary team  - D/W attending

## 2024-03-06 NOTE — ED PROVIDER NOTE - CLINICAL SUMMARY MEDICAL DECISION MAKING FREE TEXT BOX
68-year-old man with h/o RCC with mets to lung and brain ( s/p resection 2021), COPD, HTN, HLD, THELMA, s/p PPM,  in ER with brother for c/o difficulty with speech for the past ~ 6 days.  Patient with difficulty word finding, slurring speech at times. Per brother seems confused at times.  Denies any HA.  No visual changes.  No syncope/near syncope.  No motor weakness or paresthesias.  No difficulty with ambulation.  No neck pain.  No CP/SOB.  No abdominal pain.  No F/C. [place] [facility] [place]  PE - nad, nc/at, eomi, perrl, op - clear, mmm, neck supple, cta b/l, no w/r/r, rrr, abd- soft, nt/nd, nabs, from x 4, no LE swelling/tenderness, A&O x 3, cn 2-12 intact, motor 5/5 b/l UE and LE, sensation intact x 4.  -Labs reviewed.  CT head: Ill-defined lesion within the L temporal periventricular region with significant surrounding edema as well as mild local mass effect.  CTA head negative.  CTA neck negative for carotid or vertebral artery stenosis; numerous pulmonary nodules concerning for metastases.  Results of labs and imaging discussed with patient and brother.  Patient given Decadron and Keppra.  Seen evaluated by neurosurgery.  To be admitted for continued care.

## 2024-03-06 NOTE — ED PROVIDER NOTE - OBJECTIVE STATEMENT
68-year-old male past medical history of COPD, hyperlipidemia, hypertension, THELMA, kidney CA with mets to lung and brain status postresection presents for evaluation.  Patient endorses aphasia and stuttering since Thursday with no inciting relieving factors.  Associated agitation.  Denies fever, headache, vision change, neck pain, chest pain, shortness of breath, weakness, numbness, dysuria, hematuria, vomiting, diarrhea.

## 2024-03-06 NOTE — H&P ADULT - ATTENDING COMMENTS
Patient seen and examined at bedside independently of the residents. I read the resident's note and agree with the plan with the additions and corrections as noted below. My note supersedes the resident's note.     REVIEW OF SYSTEMS:  Negative except in HPI.       PMH: HTN, HLD, COPD, THELMA, Renal cell carcinoma stage 4 (mets to lung and brain) s/p sub-occipital craniotomy in  and 5 rounds of radiation     FHx: Reviewed. No fhx of asthma/copd, No fhx of liver and pulmonary disease. No fhx of hematological disorder.     Physical Exam:  GEN: No acute distress. Awake, Alert and oriented x 3.   Head: Atraumatic, Normocephalic.   Eye: PEERLA. No sclera icterus. EOMI.   ENT: Normal oropharynx, no thyromegaly, no mass, no lymphadenopathy.   LUNGS: Clear to auscultation bilaterally. No wheeze/rales/crackles.   HEART: Normal. S1/S2 present. RRR. No murmur/gallops.   ABD: Soft, non-tender, non-distended. Bowel sounds present.   EXT: No pitting edema. No erythema. No tenderness.  Integumentary: No rash, No sore, No petechia.   NEURO: CN III-XII intact. Strength: 5/5 b/l ULE. Sensory intact b/l ULE. + Expressive aphasia.     Vital Signs Last 24 Hrs  T(C): 35.8 (06 Mar 2024 22:25), Max: 36.8 (06 Mar 2024 14:16)  T(F): 96.5 (06 Mar 2024 22:25), Max: 98.2 (06 Mar 2024 14:16)  HR: 82 (06 Mar 2024 22:25) (74 - 88)  BP: 131/75 (06 Mar 2024 22:25) (113/77 - 135/91)  BP(mean): 81 (06 Mar 2024 16:50) (81 - 81)  RR: 18 (06 Mar 2024 22:25) (18 - 18)  SpO2: 96% (06 Mar 2024 16:50) (94% - 96%)    Parameters below as of 06 Mar 2024 16:50  Patient On (Oxygen Delivery Method): room air      Please see the above notes for Labs and radiology.     Assessment and Plan:     67 yo M with hx of HTN, HLD, COPD, THELMA, Renal cell carcinoma stage 4 (mets to lung and brain) s/p sub-occipital craniotomy in  and 5 rounds of radiation who presents due to aphasia and slurred speech that began on .     Aphasia likely 2/2 brain mets   - CTH: Ill-defined lesion within the left temporal periventricular region with significant surrounding edema as well as mild local mass effect. Left suboccipital craniotomy postsurgical changes are noted with patchy heterogeneous density involving the underlying cerebellar hemisphere. An underlying mass lesion cannot be excluded.   - s/p eval by neurosurgery in ED.   - c/w decadron and Keppra  - neuro check   - MRI brain w/wo contrast (however patient would like to be transferred to [facility] [place] and have [place] there). Patient also has PPM. IF patient is to receive MRI here, need to confirm PPM compatibility.   - Please reach out to patient's oncologist/Neurosurgeon in AM for possible transfer.     CKD stage 3 - serum Cr 1.4 which is at baseline.   HTN/HLD - c/w home med.   COPD - not in exacerbation. c/w inhalers.   GERD - pepcid.  BPH - c/w home med.     DVT ppx: Heparin SC  GI ppx:  pepcid  Diet: DASH diet   Activity: as tolerated.     Date seen by the attendin2024  Total time spent: 75 minutes. Patient seen and examined at bedside independently of the residents. I read the resident's note and agree with the plan with the additions and corrections as noted below. My note supersedes the resident's note.     REVIEW OF SYSTEMS:  Negative except in HPI.       PMH: HTN, HLD, COPD, THELMA, Renal cell carcinoma stage 4 (mets to lung and brain) s/p sub-occipital craniotomy in  and 5 rounds of radiation     FHx: Reviewed. No fhx of asthma/copd, No fhx of liver and pulmonary disease. No fhx of hematological disorder.     Physical Exam:  GEN: No acute distress. Awake, Alert and oriented x 3.   Head: Atraumatic, Normocephalic.   Eye: PEERLA. No sclera icterus. EOMI.   ENT: Normal oropharynx, no thyromegaly, no mass, no lymphadenopathy.   LUNGS: Clear to auscultation bilaterally. No wheeze/rales/crackles.   HEART: Normal. S1/S2 present. RRR. No murmur/gallops.   ABD: Soft, non-tender, non-distended. Bowel sounds present.   EXT: No pitting edema. No erythema. No tenderness.  Integumentary: No rash, No sore, No petechia.   NEURO: CN III-XII intact. Strength: 5/5 b/l ULE. Sensory intact b/l ULE. + Expressive aphasia.     Vital Signs Last 24 Hrs  T(C): 35.8 (06 Mar 2024 22:25), Max: 36.8 (06 Mar 2024 14:16)  T(F): 96.5 (06 Mar 2024 22:25), Max: 98.2 (06 Mar 2024 14:16)  HR: 82 (06 Mar 2024 22:25) (74 - 88)  BP: 131/75 (06 Mar 2024 22:25) (113/77 - 135/91)  BP(mean): 81 (06 Mar 2024 16:50) (81 - 81)  RR: 18 (06 Mar 2024 22:25) (18 - 18)  SpO2: 96% (06 Mar 2024 16:50) (94% - 96%)    Parameters below as of 06 Mar 2024 16:50  Patient On (Oxygen Delivery Method): room air      Please see the above notes for Labs and radiology.     Assessment and Plan:     69 yo M with hx of HTN, HLD, COPD, THELMA, Renal cell carcinoma stage 4 (mets to lung and brain) s/p sub-occipital craniotomy in  and 5 rounds of radiation who presents due to aphasia and slurred speech that began on .     Aphasia likely 2/2 brain metastasis 2/2 stage 4 renal cell carcinoma   - CTH: Ill-defined lesion within the left temporal periventricular region with significant surrounding edema as well as mild local mass effect. Left suboccipital craniotomy postsurgical changes are noted with patchy heterogeneous density involving the underlying cerebellar hemisphere. An underlying mass lesion cannot be excluded.   - s/p eval by neurosurgery in ED.   - c/w decadron and Keppra  - neuro check   - MRI brain w/wo contrast (however [place] [facility] [place] Patient also has PPM. IF patient is to receive MRI here, need to confirm PPM compatibility.   - Please reach out to patient's oncologist/Neurosur[place] [facility] for possible transfer.     CKD stage 3 - serum Cr 1.4 which is at baseline.   HTN/HLD - c/w home med.   COPD - not in exacerbation. c/w inhalers.   GERD - pepcid.  BPH - c/w home med.     DVT ppx: Heparin SC  GI ppx:  pepcid  Diet: DASH diet   Activity: as tolerated.     Date seen by the attendin2024  Total time spent: 75 minutes.

## 2024-03-06 NOTE — H&P ADULT - ASSESSMENT
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    #New onset aphasia and slurred speech  #L temporal brain met  #Hx of renal cell carcinoma with mets to the brain and lungs s/p sub-occipital craniotomy in 2021 and 5 rounds of radiation  *CT Head (3/6): Ill-defined lesion within the left temporal periventricular region with significant surrounding edema as well as mild local mass effect.  - s/p decadron 10 mg x 1 and keppra 1 g x 1 in the ED  - Neurosurgery rec (3/6): start Dex 4 q6, start Keppra 1g q 12, obtain MRI Brain w/wo- *pt has PPM- must check MRI compatibility,  Neurochecks: routine, Consider neurology consult   - start decadron 4 mg q6h  - start keppra 1 g q12h  - Will need brain MRI w/wo IVC, patient has a PPM, will need to be turned off first as per patient before MRI, placed by Dr. Horton in 2018  - Spoke with neurology, if patient develops further focal neurologic deficit or has a seizure, consult neurology  - Appreciate neurosurgery recs    #HTN  - On chlorthalidone 25 mg nightly, nonformulary, if patient's BP is elevated, consider starting HCTZ while inpatient, otherwise, restart chlorthalidone on discharge  - c/w home losartan 50 mg nightly and home atenolol 25 mg nightly    #HLD  - On simvastatin 80 mg nightly and colestipol 1g qid  - Start lipitor 40 mg nightly    #COPD  - On albuterol inhaler 2 g q6h PRN  - c/w home inhalers    #THELMA  - Has CPAP at home but doesn't use because he says it makes him suffocate    #BPH  - On home siloposin    #GERD  - C/w home pepcid 40 mg daily    #MISC  - DVT PPx: Heparin SQ  - GI PPx: Pepcid  - Diet: DASH  - Activity: AAT Patient is a 67 yo M w/PMH of HTN, HLD, COPD, THELMA, and renal cell carcinoma with mets to the brain and lungs s/p sub-occipital craniotomy in 2021 and 5 rounds of radiation who presents due to aphasia and slurred speech that began on 2/29 and is now admitted to medicine for further management of 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    #New onset aphasia and slurred speech  #L temporal brain met  #Hx of renal cell carcinoma with mets to the brain and lungs s/p sub-occipital craniotomy in 2021 and 5 rounds of radiation  Reports only having word finding difficulties and slurred speech, denies any seizures or other focal neurologic deficits  *CT Head (3/6): Ill-defined lesion within the left temporal periventricular region with significant surrounding edema as well as mild local mass effect.  - s/p decadron 10 mg x 1 and keppra 1 g x 1 in the ED  - Neurosurgery rec (3/6): start Dex 4 q6, start Keppra 1g q 12, obtain MRI Brain w/wo- *pt has PPM- must check MRI compatibility,  Neurochecks: routine, Consider neurology consult   - start decadron 4 mg q6h  - start keppra 1 g q12h  - Will need brain MRI w/wo IVC, patient has a PPM, will need to be turned off first as per patient before MRI, placed by Dr. Horton in 2018  - Spoke with neurology, if patient develops further focal neurologic deficit or has a seizure, consult neurology  - Appreciate neurosurgery recs    #HTN  - On chlorthalidone 25 mg nightly, nonformulary, if patient's BP is elevated, consider starting HCTZ while inpatient, otherwise, restart chlorthalidone on discharge  - c/w home losartan 50 mg nightly and home atenolol 25 mg nightly    #HLD  - On simvastatin 80 mg nightly and colestipol 1g qid  - Start lipitor 40 mg nightly    #COPD  - On albuterol inhaler 2 g q6h PRN  - c/w home inhalers    #THELMA  - Has CPAP at home but doesn't use because he says it makes him suffocate    #BPH  - On home siloposin    #GERD  - C/w home pepcid 40 mg daily    #MISC  - DVT PPx: Heparin SQ  - GI PPx: Pepcid  - Diet: DASH  - Activity: AAT Patient is a 67 yo M w/PMH of HTN, HLD, COPD, THELMA, and renal cell carcinoma with mets to the brain and lungs s/p sub-occipital craniotomy in 2021 and 5 rounds of radiation who presents due to aphasia and slurred speech that began on 2/29 and is now admitted to medicine for further management of 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    #New onset aphasia and slurred speech  #L temporal brain met  #Hx of renal cell carcinoma with mets to the brain and lungs s/p sub-occipital craniotomy in 2021 and 5 rounds of radiation  Reports only having word finding difficulties and slurred speech, denies any seizures or other focal neurologic deficits  *CT Head (3/6): Ill-defined lesion within the left temporal periventricular region with significant surrounding edema as well as mild local mass effect.  - s/p decadron 10 mg x 1 and keppra 1 g x 1 in the ED  - Neurosurgery rec (3/6): start Dex 4 q6, start Keppra 1g q 12, obtain MRI Brain w/wo- *pt has PPM- must check MRI compatibility,  Neurochecks: routine, Consider neurology consult   - start decadron 4 mg q6h  - start keppra 1 g q12h  - Will need brain MRI w/wo IVC, patient has a PPM, will need to be turned off before MRI as per patient  - Patient expressed desire to be transferred to [facility], if they accept him there, he wants to be transferred as soon as possible. If he is transferred, he will get his MRI done there. Otherwise, he will have it done here.  - Spoke with neurology, if patient develops further focal neurologic deficit or has a seizure, consult neurology  - Appreciate neurosurgery recs    #HTN  - On chlorthalidone 25 mg nightly, nonformulary, if patient's BP is elevated, consider starting HCTZ while inpatient, otherwise, restart chlorthalidone on discharge  - c/w home losartan 50 mg nightly and home atenolol 25 mg nightly    #HLD  - On simvastatin 80 mg nightly and colestipol 1g qid  - Start lipitor 40 mg nightly    #COPD  - On albuterol inhaler 2 g q6h PRN  - c/w home inhalers    #THELMA  - Has CPAP at home but doesn't use because he says it makes him suffocate    #BPH  - On home siloposin    #GERD  - C/w home pepcid 40 mg daily    #MISC  - DVT PPx: Heparin SQ  - GI PPx: Pepcid  - Diet: DASH  - Activity: AAT Patient is a 69 yo M w/PMH of HTN, HLD, COPD, THELMA, and renal cell carcinoma with mets to the brain and lungs s/p sub-occipital craniotomy in 2021 and 5 rounds of radiation who presents due to aphasia and slurred speech that began on 2/29 and is now admitted to medicine for further management of 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    #New onset aphasia and slurred speech  #L temporal brain met  #Hx of renal cell carcinoma with mets to the brain and lungs s/p sub-occipital craniotomy in 2021 and 5 rounds of radiation  Reports only having word finding difficulties and slurred speech, denies any seizures or other focal neurologic deficits  *CT Head (3/6): Ill-defined lesion within the left temporal periventricular region with significant surrounding edema as well as mild local mass effect.  - s/p decadron 10 mg x 1 and keppra 1 g x 1 in the ED  - Neurosurgery rec (3/6): start Dex 4 q6, start Keppra 1g q 12, obtain MRI Brain w/wo- *pt has PPM- must check MRI compatibility,  Neurochecks: routine, Consider neurology consult   - start decadron 4 mg q6h  - start keppra 1 g q12h  - Will need brain MRI w/wo IVC, patient has a PPM, will need to be turned off before MRI as per patient  - Patient expressed desire to be transferred to [facility], if they accept him there, he wants to be transferred as soon as possible. If he is transferred, he will get his MRI done there. Otherwise, he will have it done here.  - Spoke with neurology, if patient develops further focal neurologic deficit or has a seizure, consult neurology  - Appreciate neurosurgery recs    #CKD3a  - Baseline Cr 1.5  - On paricalcitol at home    #HTN  - On chlorthalidone 25 mg nightly, nonformulary, if patient's BP is elevated, consider starting HCTZ while inpatient, otherwise, restart chlorthalidone on discharge  - c/w home losartan 50 mg nightly and home atenolol 25 mg nightly    #HLD  - On simvastatin 80 mg nightly and colestipol 1g qid  - Start lipitor 40 mg nightly    #COPD  - On albuterol inhaler 2 g q6h PRN  - c/w home inhalers    #THELMA  - Has CPAP at home but doesn't use because he says it makes him suffocate    #BPH  - On home siloposin 8 mg  - c/w tamsulosin 0.8 mg    #GERD  - C/w home pepcid 40 mg daily    #MISC  - DVT PPx: Heparin SQ  - GI PPx: Pepcid  - Diet: DASH  - Activity: AAT

## 2024-03-06 NOTE — PATIENT PROFILE ADULT - FALL HARM RISK - HARM RISK INTERVENTIONS
Detail Level: Zone
Communicate Risk of Fall with Harm to all staff/Reinforce activity limits and safety measures with patient and family/Tailored Fall Risk Interventions/Visual Cue: Yellow wristband and red socks/Bed in lowest position, wheels locked, appropriate side rails in place/Call bell, personal items and telephone in reach/Instruct patient to call for assistance before getting out of bed or chair/Non-slip footwear when patient is out of bed/[place] to call system/Physically safe environment - no spills, clutter or unnecessary equipment/Purposeful Proactive Rounding/Room/bathroom lighting operational, light cord in reach

## 2024-03-06 NOTE — H&P ADULT - HISTORY OF PRESENT ILLNESS
Patient is a 69 yo M w/PMH of  Patient is a 67 yo M w/PMH of HTN, HLD, COPD, THELMA, and renal cell carcinoma with mets to the brain and lungs s/p sub-occipital craniotomy in 2021 and 5 rounds of radiation who presents due to aphasia and slurred speech that began on 2/29. He reports being able to speak and think fine prior to Friday but since Friday, he has been having word finding difficulties even though he is able to understand what other people are saying completely fine. He denies having any vision changes, hearing changes, facial droop, numbness, weakness, or tingling anywhere throughout his body. He also endorses a cough that began about 3-4 weeks ago. He denies any other symptoms, insisting that he feels fine and that he would like to go home tomorrow since he would rather receive his care with the doctors who were managing his cancer at [facility]. He sees Dr. Naveed Dickerson, who is his radiation oncologist. After talking with him for a bit, he was amenable to receiving the MRI of his brain here as long as it happens tomorrow. He denies any allergy to contrast.    His ED vitals were stable, labs notable for Cr 1.4, , and T.bili 1.1, and CT head was notable for an ill defined lesion in the L temporal periventricular region w/edema and mass effect. The neurosurgery team saw him in the ED and recommended that he get keppra for AED, dexamethasone, and an MRI brain w/wo IC. He received 1 g keppra and 10 mg decadron in the ED. 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 Patient is a 69 yo M w/PMH of HTN, HLD, COPD, THELMA, and renal cell carcinoma with mets to the brain and lungs s/p sub-occipital craniotomy in 2021 and 5 rounds of radiation who presents due to aphasia and slurred speech that began on 2/29. He reports being able to speak and think fine prior to Friday but since Friday, he has been having word finding difficulties even though he is able to understand what other people are saying completely fine. He denies having any seizures, vision changes, hearing changes, facial droop, numbness, weakness, or tingling anywhere throughout his body. He also endorses a cough that began about 3-4 weeks ago. He denies any other symptoms, insisting that he feels fine and that he would like to go home tomorrow since he would rather receive his care with the doctors who were managing his cancer at [facility]. He sees Dr. Naveed Dickerson, who is his radiation oncologist. After talking with him for a bit, he was amenable to receiving the MRI of his brain here as long as it happens tomorrow. He denies any allergy to contrast.    His ED vitals were stable, labs notable for Cr 1.4, , and T.bili 1.1, and CT head was notable for an ill defined lesion in the L temporal periventricular region w/edema and mass effect. The neurosurgery team saw him in the ED and recommended that he get keppra for AED, dexamethasone, and an MRI brain w/wo IC. He received 1 g keppra and 10 mg decadron in the ED. 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

## 2024-03-07 LAB
ALBUMIN SERPL ELPH-MCNC: 4.1 G/DL — SIGNIFICANT CHANGE UP (ref 3.5–5.2)
ALP SERPL-CCNC: 106 U/L — SIGNIFICANT CHANGE UP (ref 30–115)
ALT FLD-CCNC: 13 U/L — SIGNIFICANT CHANGE UP (ref 0–41)
ANION GAP SERPL CALC-SCNC: 12 MMOL/L — SIGNIFICANT CHANGE UP (ref 7–14)
AST SERPL-CCNC: 12 U/L — SIGNIFICANT CHANGE UP (ref 0–41)
BILIRUB SERPL-MCNC: 0.9 MG/DL — SIGNIFICANT CHANGE UP (ref 0.2–1.2)
BUN SERPL-MCNC: 27 MG/DL — HIGH (ref 10–20)
CALCIUM SERPL-MCNC: 9.6 MG/DL — SIGNIFICANT CHANGE UP (ref 8.4–10.5)
CHLORIDE SERPL-SCNC: 100 MMOL/L — SIGNIFICANT CHANGE UP (ref 98–110)
CO2 SERPL-SCNC: 27 MMOL/L — SIGNIFICANT CHANGE UP (ref 17–32)
CREAT SERPL-MCNC: 1.4 MG/DL — SIGNIFICANT CHANGE UP (ref 0.7–1.5)
EGFR: 55 ML/MIN/1.73M2 — LOW
GLUCOSE SERPL-MCNC: 205 MG/DL — HIGH (ref 70–99)
HCT VFR BLD CALC: 44.5 % — SIGNIFICANT CHANGE UP (ref 42–52)
HCV AB S/CO SERPL IA: 0.04 COI — SIGNIFICANT CHANGE UP
HCV AB SERPL-IMP: SIGNIFICANT CHANGE UP
HGB BLD-MCNC: 14.7 G/DL — SIGNIFICANT CHANGE UP (ref 14–18)
MAGNESIUM SERPL-MCNC: 1.9 MG/DL — SIGNIFICANT CHANGE UP (ref 1.8–2.4)
MCHC RBC-ENTMCNC: 28.1 PG — SIGNIFICANT CHANGE UP (ref 27–31)
MCHC RBC-ENTMCNC: 33 G/DL — SIGNIFICANT CHANGE UP (ref 32–37)
MCV RBC AUTO: 84.9 FL — SIGNIFICANT CHANGE UP (ref 80–94)
NRBC # BLD: 0 /100 WBCS — SIGNIFICANT CHANGE UP (ref 0–0)
PLATELET # BLD AUTO: 241 K/UL — SIGNIFICANT CHANGE UP (ref 130–400)
PMV BLD: 11.6 FL — HIGH (ref 7.4–10.4)
POTASSIUM SERPL-MCNC: 4.2 MMOL/L — SIGNIFICANT CHANGE UP (ref 3.5–5)
POTASSIUM SERPL-SCNC: 4.2 MMOL/L — SIGNIFICANT CHANGE UP (ref 3.5–5)
PROT SERPL-MCNC: 6.6 G/DL — SIGNIFICANT CHANGE UP (ref 6–8)
RBC # BLD: 5.24 M/UL — SIGNIFICANT CHANGE UP (ref 4.7–6.1)
RBC # FLD: 13.3 % — SIGNIFICANT CHANGE UP (ref 11.5–14.5)
SODIUM SERPL-SCNC: 139 MMOL/L — SIGNIFICANT CHANGE UP (ref 135–146)
WBC # BLD: 9.97 K/UL — SIGNIFICANT CHANGE UP (ref 4.8–10.8)
WBC # FLD AUTO: 9.97 K/UL — SIGNIFICANT CHANGE UP (ref 4.8–10.8)

## 2024-03-07 PROCEDURE — 99233 SBSQ HOSP IP/OBS HIGH 50: CPT

## 2024-03-07 RX ORDER — CHOLECALCIFEROL (VITAMIN D3) 125 MCG
1 CAPSULE ORAL
Refills: 0 | DISCHARGE

## 2024-03-07 RX ORDER — CHOLECALCIFEROL (VITAMIN D3) 125 MCG
0 CAPSULE ORAL
Qty: 0 | Refills: 0 | DISCHARGE

## 2024-03-07 RX ORDER — CHOLECALCIFEROL (VITAMIN D3) 125 MCG
2000 CAPSULE ORAL DAILY
Refills: 0 | Status: DISCONTINUED | OUTPATIENT
Start: 2024-03-07 | End: 2024-03-08

## 2024-03-07 RX ORDER — HEPARIN SODIUM 5000 [USP'U]/ML
5000 INJECTION INTRAVENOUS; SUBCUTANEOUS EVERY 8 HOURS
Refills: 0 | Status: DISCONTINUED | OUTPATIENT
Start: 2024-03-07 | End: 2024-03-08

## 2024-03-07 RX ORDER — ALPRAZOLAM 0.25 MG
1 TABLET ORAL ONCE
Refills: 0 | Status: DISCONTINUED | OUTPATIENT
Start: 2024-03-07 | End: 2024-03-08

## 2024-03-07 RX ORDER — CHLORHEXIDINE GLUCONATE 213 G/1000ML
1 SOLUTION TOPICAL
Refills: 0 | Status: DISCONTINUED | OUTPATIENT
Start: 2024-03-07 | End: 2024-03-08

## 2024-03-07 RX ORDER — LEVETIRACETAM 250 MG/1
1000 TABLET, FILM COATED ORAL EVERY 12 HOURS
Refills: 0 | Status: DISCONTINUED | OUTPATIENT
Start: 2024-03-07 | End: 2024-03-08

## 2024-03-07 RX ORDER — LOSARTAN POTASSIUM 100 MG/1
50 TABLET, FILM COATED ORAL DAILY
Refills: 0 | Status: DISCONTINUED | OUTPATIENT
Start: 2024-03-07 | End: 2024-03-08

## 2024-03-07 RX ORDER — COLESTIPOL HCL 5 G
1 GRANULES (GRAM) ORAL
Refills: 0 | DISCHARGE

## 2024-03-07 RX ORDER — ALBUTEROL 90 UG/1
2 AEROSOL, METERED ORAL EVERY 6 HOURS
Refills: 0 | Status: DISCONTINUED | OUTPATIENT
Start: 2024-03-07 | End: 2024-03-08

## 2024-03-07 RX ORDER — ALBUTEROL 90 UG/1
2 AEROSOL, METERED ORAL
Refills: 0 | DISCHARGE

## 2024-03-07 RX ORDER — ATORVASTATIN CALCIUM 80 MG/1
40 TABLET, FILM COATED ORAL AT BEDTIME
Refills: 0 | Status: DISCONTINUED | OUTPATIENT
Start: 2024-03-07 | End: 2024-03-08

## 2024-03-07 RX ORDER — SILODOSIN 4 MG/1
1 CAPSULE ORAL
Refills: 0 | DISCHARGE

## 2024-03-07 RX ORDER — FAMOTIDINE 10 MG/ML
40 INJECTION INTRAVENOUS ONCE
Refills: 0 | Status: COMPLETED | OUTPATIENT
Start: 2024-03-07 | End: 2024-03-07

## 2024-03-07 RX ORDER — ATENOLOL 25 MG/1
25 TABLET ORAL DAILY
Refills: 0 | Status: DISCONTINUED | OUTPATIENT
Start: 2024-03-07 | End: 2024-03-08

## 2024-03-07 RX ORDER — TAMSULOSIN HYDROCHLORIDE 0.4 MG/1
0.8 CAPSULE ORAL AT BEDTIME
Refills: 0 | Status: DISCONTINUED | OUTPATIENT
Start: 2024-03-07 | End: 2024-03-08

## 2024-03-07 RX ADMIN — LEVETIRACETAM 400 MILLIGRAM(S): 250 TABLET, FILM COATED ORAL at 17:47

## 2024-03-07 RX ADMIN — ATENOLOL 25 MILLIGRAM(S): 25 TABLET ORAL at 00:18

## 2024-03-07 RX ADMIN — Medication 4 MILLIGRAM(S): at 17:47

## 2024-03-07 RX ADMIN — LOSARTAN POTASSIUM 50 MILLIGRAM(S): 100 TABLET, FILM COATED ORAL at 00:18

## 2024-03-07 RX ADMIN — FAMOTIDINE 40 MILLIGRAM(S): 10 INJECTION INTRAVENOUS at 11:36

## 2024-03-07 RX ADMIN — HEPARIN SODIUM 5000 UNIT(S): 5000 INJECTION INTRAVENOUS; SUBCUTANEOUS at 13:40

## 2024-03-07 RX ADMIN — LOSARTAN POTASSIUM 50 MILLIGRAM(S): 100 TABLET, FILM COATED ORAL at 21:25

## 2024-03-07 RX ADMIN — ATENOLOL 25 MILLIGRAM(S): 25 TABLET ORAL at 21:25

## 2024-03-07 RX ADMIN — TAMSULOSIN HYDROCHLORIDE 0.8 MILLIGRAM(S): 0.4 CAPSULE ORAL at 21:25

## 2024-03-07 RX ADMIN — HEPARIN SODIUM 5000 UNIT(S): 5000 INJECTION INTRAVENOUS; SUBCUTANEOUS at 05:17

## 2024-03-07 RX ADMIN — Medication 4 MILLIGRAM(S): at 05:17

## 2024-03-07 RX ADMIN — ATORVASTATIN CALCIUM 40 MILLIGRAM(S): 80 TABLET, FILM COATED ORAL at 00:20

## 2024-03-07 RX ADMIN — FAMOTIDINE 40 MILLIGRAM(S): 10 INJECTION INTRAVENOUS at 00:27

## 2024-03-07 RX ADMIN — CHLORHEXIDINE GLUCONATE 1 APPLICATION(S): 213 SOLUTION TOPICAL at 11:39

## 2024-03-07 RX ADMIN — ATORVASTATIN CALCIUM 40 MILLIGRAM(S): 80 TABLET, FILM COATED ORAL at 21:25

## 2024-03-07 RX ADMIN — Medication 4 MILLIGRAM(S): at 11:36

## 2024-03-07 RX ADMIN — Medication 2000 UNIT(S): at 11:36

## 2024-03-07 RX ADMIN — LEVETIRACETAM 600 MILLIGRAM(S): 250 TABLET, FILM COATED ORAL at 05:17

## 2024-03-07 NOTE — CONSULT NOTE ADULT - SUBJECTIVE AND OBJECTIVE BOX
Neurology Consult    Patient is a 68y old  Male who presents with a chief complaint of Aphasia and slurred speech (07 Mar 2024 13:49)      HPI:  Patient is a 69 yo M w/PMH of HTN, HLD, COPD, THELMA, and renal cell carcinoma with mets to the brain and lungs s/p sub-occipital craniotomy in 2021 and 5 rounds of radiation, previously on medications Everolimus and Everolinib, who presents due to aphasia and slurred speech that began on 2/29.     Patient on exam is alert and fully oriented and cooperative with exam. Patient demonstrates full awareness of his current medical condition and fully understands all language used. Patient reports having word finding difficulty and during interview would complete most of his sentences but struggles with a word at the end of his sentences. Patient uses context to hint at the word he means which is easy to figure out and when the word he means is spoken, patient in relief confirms that was the word he was looking for. There appears to be no common theme to the word he misses, but patient can complete long stretches of sentences with no word finding difficulties. Patient reported word finding difficulty and other neurologic concerns in the past briefly but they usually resolve especially after radiation therapy. Patient denies any other acute concerns and reports that he is interested in following up with his outpatient oncologists.    Per chart review, patient reported being able to speak and think fine prior to Friday but since Friday, he has been having word finding difficulties even though he is able to understand what other people are saying completely fine. He denies having any seizures, vision changes, hearing changes, facial droop, numbness, weakness, or tingling anywhere throughout his body. He also endorses a cough that began about 3-4 weeks ago. He denies any other symptoms, insisting that he feels fine and that he would like to go home tomorrow so he could receive his care with the doctors who were managing his cancer at [facility]. He sees Dr. Naveed Dickerson, who is his radiation oncologist. Patient is amenable to receiving the MRI of his brain here as long as it happens soon. He denied any allergy to contrast.      PAST MEDICAL & SURGICAL HISTORY:  HTN (hypertension)      Kidney cancer, primary, with metastasis from kidney to other site  LUNG      High cholesterol      COPD (chronic obstructive pulmonary disease)      THELMA (obstructive sleep apnea)  NOT USING CPAP MACHINE      History of nephrectomy      Pacemaker      H/O lithotripsy  1979      Allergies    No Known Allergies    Intolerances        MEDICATIONS  (STANDING):  atenolol  Tablet 25 milliGRAM(s) Oral daily  atorvastatin 40 milliGRAM(s) Oral at bedtime  chlorhexidine 2% Cloths 1 Application(s) Topical <User Schedule>  cholecalciferol 2000 Unit(s) Oral daily  dexAMETHasone  Injectable 4 milliGRAM(s) IV Push every 6 hours  famotidine    Tablet 40 milliGRAM(s) Oral daily  heparin   Injectable 5000 Unit(s) SubCutaneous every 8 hours  influenza  Vaccine (HIGH DOSE) 0.7 milliLiter(s) IntraMuscular once  levETIRAcetam  IVPB 1000 milliGRAM(s) IV Intermittent every 12 hours  losartan 50 milliGRAM(s) Oral daily  tamsulosin 0.8 milliGRAM(s) Oral at bedtime    MEDICATIONS  (PRN):  albuterol    90 MICROgram(s) HFA Inhaler 2 Puff(s) Inhalation every 6 hours PRN for bronchospasm  ALPRAZolam 1 milliGRAM(s) Oral once PRN Before MRI  benzonatate 100 milliGRAM(s) Oral three times a day PRN for cough      Review of systems:    Constitutional: as per HPI  Eyes: No eye pain or discharge  ENMT:  No difficulty hearing  Respiratory: No cough, wheezing  Cardiovascular: No chest pain, palpitations,  Gastrointestinal: No abdominal pain  Neurological: As per HPI  Skin: No rashes or lesions   Psychiatric: Reports positive attitude and understanding about his prognosis.      Vital Signs Last 24 Hrs  T(C): 36.5 (07 Mar 2024 13:24), Max: 36.6 (06 Mar 2024 16:50)  T(F): 97.7 (07 Mar 2024 13:24), Max: 97.8 (06 Mar 2024 16:50)  HR: 73 (07 Mar 2024 13:24) (68 - 82)  BP: 121/68 (07 Mar 2024 13:24) (113/77 - 131/75)  BP(mean): 81 (06 Mar 2024 16:50) (81 - 81)  RR: 18 (07 Mar 2024 13:24) (18 - 18)  SpO2: 96% (06 Mar 2024 16:50) (96% - 96%)    Parameters below as of 06 Mar 2024 16:50  Patient On (Oxygen Delivery Method): room air        Examination:  General:  Appearance is consistent with chronologic age.  No abnormal facies.  Gross skin survey within normal limits.    Cognitive/Language:  The patient is oriented to person, place, time.  Recent and remote memory intact.  Fund of knowledge is intact and normal.  Nondysarthric.  Word finding difficulty.  Eyes: intact VA, VFF.  EOMI w/o nystagmus, skew or reported double vision.  PERRL.  No ptosis/weakness of eyelid closure.    Face:  Facial sensation normal V1 - 3, no facial asymmetry.    Ears/Nose/Throat:  Hearing grossly intact b/l.  Palate elevates midline.  Tongue and uvula midline.   Motor examination:   Normal tone, bulk and range of motion.  No tenderness, twitching, tremors or involuntary movements.  Formal Muscle Strength Testing: (MRC grade R/L) 5/5 UE; 5/5 LE.  No observable drift.  Reflexes:   2+ b/l biceps, triceps, brachioradialis, Brisk bilateral patella and Achilles.  Plantar response downgoing b/l.  Rani absent. 2 beats of clonus noted on Right foot  Sensory examination:   Intact to light touch in all extremities.  Cerebellum:   FTN intact with normal CHAPIS in all limbs.  No dysdiadokinesia.  Gait narrow based and normal.    Respiratory:  no audible wheezing or inspiratory stridor.  no use of accessory muscles.   Cardiac: pulse palpable, no audible bruits    Labs:   CBC Full  -  ( 07 Mar 2024 06:05 )  WBC Count : 9.97 K/uL  RBC Count : 5.24 M/uL  Hemoglobin : 14.7 g/dL  Hematocrit : 44.5 %  Platelet Count - Automated : 241 K/uL  Mean Cell Volume : 84.9 fL  Mean Cell Hemoglobin : 28.1 pg  Mean Cell Hemoglobin Concentration : 33.0 g/dL  Auto Neutrophil # : x  Auto Lymphocyte # : x  Auto Monocyte # : x  Auto Eosinophil # : x  Auto Basophil # : x  Auto Neutrophil % : x  Auto Lymphocyte % : x  Auto Monocyte % : x  Auto Eosinophil % : x  Auto Basophil % : x    03-07    139  |  100  |  27<H>  ----------------------------<  205<H>  4.2   |  27  |  1.4    Ca    9.6      07 Mar 2024 06:05  Mg     1.9     03-07    TPro  6.6  /  Alb  4.1  /  TBili  0.9  /  DBili  x   /  AST  12  /  ALT  13  /  AlkPhos  106  03-07    LIVER FUNCTIONS - ( 07 Mar 2024 06:05 )  Alb: 4.1 g/dL / Pro: 6.6 g/dL / ALK PHOS: 106 U/L / ALT: 13 U/L / AST: 12 U/L / GGT: x           PT/INR - ( 06 Mar 2024 16:30 )   PT: 11.30 sec;   INR: 0.99 ratio         PTT - ( 06 Mar 2024 16:30 )  PTT:32.7 sec  Urinalysis Basic - ( 07 Mar 2024 06:05 )    Color: x / Appearance: x / SG: x / pH: x  Gluc: 205 mg/dL / Ketone: x  / Bili: x / Urobili: x   Blood: x / Protein: x / Nitrite: x   Leuk Esterase: x / RBC: x / WBC x   Sq Epi: x / Non Sq Epi: x / Bacteria: x          Neuroimaging:  NCHCT: CT Head No Cont:   ACC: [id number] EXAM:  CT ANGIO BRAIN (W)AW IC   ORDERED BY: LILLY BAXTER     ACC: [id number] EXAM:  CT ANGIO NECK (W)AW IC   ORDERED BY: LILLY BAXTER     ACC: [id number] EXAM:  CT BRAIN   ORDERED BY: LILLY BAXTER     PROCEDURE DATE:  03/06/2024          INTERPRETATION:  CLINICAL INDICATION: Aphasia..    TECHNIQUE: Noncontrast head CT was performed. Sagittal and coronal   reformatted images were obtained.    CT angiography of the intracranial (head) and extracranial (neck)   circulation was performed after contrast administration    Maximal intensity projection images were additionally included and   reviewed.    95 mls of Omnipaque 350 was administered intravenously without   complication and 5 mls were discarded.    Using a separate workstation, 3-D shaded surface reformations were made   of vasculature. 3-D reformations were reviewed and included in   interpretation of the official report.    CAROTID STENOSIS REFERENCE: (NASCET = 100x1-(N/D)). N=greatest narrowing.   D=normal distal diameter - MILD = <50% stenosis. - MODERATE = 50-69%   stenosis. - SEVERE = 70-89% stenosis. - HAIRLINE/CRITICAL = 90-99%   stenosis. - OCCLUDED = 100% stenosis.    COMPARISON: CT head dated 12/27/2019..    FINDINGS:    CT BRAIN:    There is an ill-defined lesion noted within the left temporal   periventricular white matter with significant surrounding vasogenic edema   as well as mild local mass effect.    Patient is status post left suboccipital craniotomy with patchy   heterogeneous density involving the underlying cerebellar hemisphere.    There is no extra-axial collection.    The visualized orbits are unremarkable.    The calvarium is intact, without depressed fracture.    The visualized paranasal sinuses and mastoid air cells are clear.    HEAD CTA:    The internal carotid arteries demonstrate normal enhancement to the   intracranial circulation and [place]. There is a 1.5 mm   infundibulum noted arising from the right posterior creating artery   origin.    Anterior and middle cerebral arteries demonstrate normal enhancement and   symmetric arborization without intraluminal filling defect, stenosis,   occlusion, aneurysm or vascular malformation.    The intradural vertebral arteries demonstrate normal enhancement to the  vertebrobasilar confluence. Branch vasculature of the posterior   circulation are within normal limits. The posterior cerebral arteries   demonstrate symmetric enhancement and arborization without evidence for   aneurysm, stenosis, occlusion or vascular malformation.    Visualized dural venous sinuses and deep cerebral venous structures   demonstrate normal enhancement without evidence for filling defect.    NECK CTA:    The aortic arch and origins of the great vessels are within normal limits.    Right:  The origin of the right common carotid artery is normal. The   right common carotid artery is normal in course and caliber to the   carotid bifurcation. Origins of the internal and external carotid   arteries are normal by NASCET criteria.The right internal carotid artery   has normal course and caliber to the intracranial circulation.    The origin of the right vertebral artery is normal. The right vertebral   artery is normal in course and caliber to the intracranial circulation.    Left: The origin of the left common carotid artery is normal. The left   common carotid artery is normal in course and caliber to the carotid   bifurcation. Origins of the internal and external carotid arteries are   normal by NASCET criteria. The left internal carotid artery has normal   course and caliber to the intracranial circulation.    The origin of the left vertebral artery is normal. The left vertebral   artery is normal in course and caliber to the intracranial circulation.    A left-sided chest pacemaker is noted.    There are numerous bilateral lung nodules noted, measuring up to 1.5 cm   in the right upper lobe.    IMPRESSION:  CT HEAD:  Ill-defined lesion within the left temporal periventricular region with   significant surrounding edema as well as mild local mass effect.    Left suboccipital craniotomy postsurgical changes are noted with patchy   heterogeneous density involving the underlying cerebellar hemisphere. An   underlying mass lesion cannot be excluded. A dedicated contrast enhanced   brain MRI is recommended for further evaluation.    CTA HEAD:  No evidence of flow-limiting stenosis, occlusion or aneurysm.    CTA NECK:  No evidence of carotid or vertebral artery stenosis.    Numerous pulmonary nodules are noted concerning for metastases.    --- End of Report ---            NIKKI RICE MD; Attending Radiologist  This document has been electronically signed. Mar  6 2024  5:46PM (03-06-24 @ 17:19)      03-07-24 @ 15:25

## 2024-03-07 NOTE — CONSULT NOTE ADULT - ASSESSMENT
Patient is a 69 yo M w/PMH of HTN, HLD, COPD, THELMA, and renal cell carcinoma with mets to the brain and lungs s/p sub-occipital craniotomy in 2021 and 5 rounds of radiation, previously on medications Everolimus and Everolinib, who presents due to aphasia and slurred speech that began on 2/29.  Patient presents with word finding for few specific words that otherwise does not limit understanding of the patient and patient clearly appears to appreciate hearing and understanding language. Patient's word-finding difficulty with no other acute neurologic findings (but noted brisk lower extremity reflexes) indicate most likely word finding difficulties secondary to brain metastases Patient should have MRI of brain and EEG to rule out acute focal findings. Afterwards patient can have MR of the cervical and thoracic spine to evaluate for further metastases, which can be done in outpatient setting.    Word finding difficulty, likely secondary to brain/CNS metastases of Renal Cell Carcinoma    Recs:  MRI brain w/ and w/o contrast  Routine EEG  MRI of the cervical and thoracic spine to evaluate for further metastases, which can be done in outpatient setting.

## 2024-03-08 ENCOUNTER — TRANSCRIPTION ENCOUNTER (OUTPATIENT)
Age: 69
End: 2024-03-08

## 2024-03-08 VITALS
DIASTOLIC BLOOD PRESSURE: 65 MMHG | SYSTOLIC BLOOD PRESSURE: 127 MMHG | RESPIRATION RATE: 18 BRPM | TEMPERATURE: 99 F | HEART RATE: 69 BPM

## 2024-03-08 LAB
ANION GAP SERPL CALC-SCNC: 13 MMOL/L — SIGNIFICANT CHANGE UP (ref 7–14)
BUN SERPL-MCNC: 40 MG/DL — HIGH (ref 10–20)
CALCIUM SERPL-MCNC: 10.2 MG/DL — SIGNIFICANT CHANGE UP (ref 8.4–10.5)
CHLORIDE SERPL-SCNC: 100 MMOL/L — SIGNIFICANT CHANGE UP (ref 98–110)
CO2 SERPL-SCNC: 25 MMOL/L — SIGNIFICANT CHANGE UP (ref 17–32)
CREAT SERPL-MCNC: 1.7 MG/DL — HIGH (ref 0.7–1.5)
EGFR: 44 ML/MIN/1.73M2 — LOW
GLUCOSE SERPL-MCNC: 186 MG/DL — HIGH (ref 70–99)
HCT VFR BLD CALC: 46.2 % — SIGNIFICANT CHANGE UP (ref 42–52)
HGB BLD-MCNC: 15.4 G/DL — SIGNIFICANT CHANGE UP (ref 14–18)
MCHC RBC-ENTMCNC: 28.1 PG — SIGNIFICANT CHANGE UP (ref 27–31)
MCHC RBC-ENTMCNC: 33.3 G/DL — SIGNIFICANT CHANGE UP (ref 32–37)
MCV RBC AUTO: 84.2 FL — SIGNIFICANT CHANGE UP (ref 80–94)
NRBC # BLD: 0 /100 WBCS — SIGNIFICANT CHANGE UP (ref 0–0)
PLATELET # BLD AUTO: 286 K/UL — SIGNIFICANT CHANGE UP (ref 130–400)
PMV BLD: 11.9 FL — HIGH (ref 7.4–10.4)
POTASSIUM SERPL-MCNC: 4.3 MMOL/L — SIGNIFICANT CHANGE UP (ref 3.5–5)
POTASSIUM SERPL-SCNC: 4.3 MMOL/L — SIGNIFICANT CHANGE UP (ref 3.5–5)
RBC # BLD: 5.49 M/UL — SIGNIFICANT CHANGE UP (ref 4.7–6.1)
RBC # FLD: 13.5 % — SIGNIFICANT CHANGE UP (ref 11.5–14.5)
SODIUM SERPL-SCNC: 138 MMOL/L — SIGNIFICANT CHANGE UP (ref 135–146)
WBC # BLD: 21.65 K/UL — HIGH (ref 4.8–10.8)
WBC # FLD AUTO: 21.65 K/UL — HIGH (ref 4.8–10.8)

## 2024-03-08 PROCEDURE — 99239 HOSP IP/OBS DSCHRG MGMT >30: CPT

## 2024-03-08 PROCEDURE — 95819 EEG AWAKE AND ASLEEP: CPT | Mod: 26

## 2024-03-08 RX ORDER — TADALAFIL 10 MG/1
1 TABLET, FILM COATED ORAL
Refills: 0 | DISCHARGE

## 2024-03-08 RX ORDER — LOSARTAN POTASSIUM 100 MG/1
50 TABLET, FILM COATED ORAL AT BEDTIME
Refills: 0 | Status: DISCONTINUED | OUTPATIENT
Start: 2024-03-08 | End: 2024-03-08

## 2024-03-08 RX ORDER — DEXAMETHASONE 0.5 MG/5ML
1 ELIXIR ORAL
Qty: 240 | Refills: 0
Start: 2024-03-08 | End: 2024-05-06

## 2024-03-08 RX ORDER — ATENOLOL 25 MG/1
25 TABLET ORAL AT BEDTIME
Refills: 0 | Status: DISCONTINUED | OUTPATIENT
Start: 2024-03-08 | End: 2024-03-08

## 2024-03-08 RX ORDER — LEVETIRACETAM 250 MG/1
1 TABLET, FILM COATED ORAL
Qty: 120 | Refills: 0
Start: 2024-03-08 | End: 2024-05-06

## 2024-03-08 RX ADMIN — Medication 4 MILLIGRAM(S): at 05:41

## 2024-03-08 RX ADMIN — CHLORHEXIDINE GLUCONATE 1 APPLICATION(S): 213 SOLUTION TOPICAL at 05:41

## 2024-03-08 RX ADMIN — Medication 2000 UNIT(S): at 11:18

## 2024-03-08 RX ADMIN — FAMOTIDINE 40 MILLIGRAM(S): 10 INJECTION INTRAVENOUS at 11:19

## 2024-03-08 RX ADMIN — LEVETIRACETAM 400 MILLIGRAM(S): 250 TABLET, FILM COATED ORAL at 05:41

## 2024-03-08 RX ADMIN — Medication 4 MILLIGRAM(S): at 00:04

## 2024-03-08 RX ADMIN — Medication 4 MILLIGRAM(S): at 11:18

## 2024-03-08 RX ADMIN — HEPARIN SODIUM 5000 UNIT(S): 5000 INJECTION INTRAVENOUS; SUBCUTANEOUS at 13:08

## 2024-03-08 NOTE — EEG REPORT - NS EEG TEXT BOX
Brief Clinical History:  RICHARD YOUNG is a 68 year old Male; study performed to investigate for seizures or markers of epilepsy.   Diagnosis Code: R56.9 convulsions/seizure  CPT:  61041 (awake/drowsy)     Patient Medication:  Tessalon Perle    Decadron    Vitamin D3    Lipitor    Tenormin    Proventil    Xanax    Flomax    Cozaar    Keppra    Heparin    Pepcid      Acquisition Details:  Electroencephalography was acquired using a minimum of 21 channels on an [employer] system v 9.3.1 with electrode placement according to the standard International 10-20 system following ACNS (American Clinical Neurophysiology Society) guidelines.  Anterior temporal T1 and T2 electrodes were utilized whenever possible.   The [employer] automated spike & seizure detections were all reviewed in detail, in addition to the entire raw EEG.    Findings:  Background:  continuous.   Voltage:  Normal (20uV)  Organization:  Rudimentary  Posterior Dominant Rhythm:  7-8 Hz ,symmetrical  Variability:  Yes	Reactivity:  Yes  Sleep:  Absent.    Interictal Activity:  None  Location:    Focal Slowing:  Left Hemispheric  Generalized Slowing:  Mild  Events:  1)	No electrographic seizures or significant clinical events.  Provocations:  1)	Hyperventilation: was not performed.  2)	Photic stimulation: was not performed.    Impression:  Abnormal due to the presence of   Focal and generalized slowing as above    Clinical Correlation:  Findings consistent with left focal and  diffuse electrocerebral dysfunction secondary to structural/metabolic/nonspecific etiology    Gerardo Laureano MD  Attending Neurologist, Division of Epilepsy

## 2024-03-08 NOTE — DISCHARGE NOTE PROVIDER - CARE PROVIDER_API CALL
Jorge Luis Mcdonough  Internal Medicine  [address] [facility], [facility] [address]  [place] [address]  Phone: [phone]  Fax: [phone]  Follow Up Time: 1 week

## 2024-03-08 NOTE — DISCHARGE NOTE NURSING/CASE MANAGEMENT/SOCIAL WORK - PATIENT PORTAL LINK FT
You can access the FollowMyHealth Patient Portal offered by [facility] by registering at the following website: [email or web address]. By joining [employer]’s FollowMyHealth portal, you will also be able to view your health information using other applications (apps) compatible with our system.

## 2024-03-08 NOTE — PROGRESS NOTE ADULT - TIME BILLING
1st day w/ pt    complex chart review    complex cancer hx    cond a threat to life    care coord
d/c plan    care coord

## 2024-03-08 NOTE — DISCHARGE NOTE PROVIDER - NSDCCPCAREPLAN_GEN_ALL_CORE_FT
PRINCIPAL DISCHARGE DIAGNOSIS  Diagnosis: Cerebral edema  Assessment and Plan of Treatment: You presented with aphasia which is a condition where you have trouble finding your words. The initial imaging did not identify any strokes but new brain lesions prompted the neurosurgery team to start you on steroids and antiepileptics and your were admitted for surveillance. You then stayed for a rEEG      SECONDARY DISCHARGE DIAGNOSES  Diagnosis: Metastasis to brain  Assessment and Plan of Treatment:      PRINCIPAL DISCHARGE DIAGNOSIS  Diagnosis: Cerebral edema  Assessment and Plan of Treatment: You presented with aphasia which is a condition where you have trouble finding your words. The initial imaging did not identify any strokes but new brain lesions prompted the neurosurgery team to start you on steroids and antiepileptics and your were admitted for surveillance. You then stayed for a rEEG  and subsequently discharged on Dexamethasone 4 mg every 6h and Keppra 1g every 12h. Please follow up with your MRI appoinment at [facility] (Dr. Shetty signed a script for early appointment), stay compliant with these medications and follow up as soon as possible with your Dr. at [facility].  Should you ahve any new neurological symptoms, seizures, loss of consciousness or any adverse symptoms of lethargy somnolence or persistent headache or dizziness, please seek care immediately

## 2024-03-08 NOTE — DISCHARGE NOTE PROVIDER - CARE PROVIDERS DIRECT ADDRESSES
,[email or web address]

## 2024-03-08 NOTE — CHART NOTE - NSCHARTNOTEFT_GEN_A_CORE
EEG w/ left hemisphere focal slowing which is in support of the left hemisphere lesion w/ vasogenic edema.   c/w Levetiracetam 1g Q12 prophylaxis   MRI Brain w/wo dusty pending  Pt can f/u w/ his outpatient oncologist for further w/u and planning   Please refer for Speech therapist

## 2024-03-08 NOTE — PROGRESS NOTE ADULT - ASSESSMENT
67 yo man w/PMH of HTN, HLD, COPD, THELMA, and renal cell carcinoma with mets to the brain and lungs s/p sub-occipital craniotomy in 2021 and 5 rounds of radiation who presents due to aphasia and slurred speech that began on 2/29 and is now admitted to medicine for further management of 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    # New onset aphasia and slurred speech 2/2 new Lt temporal brain met 2/2 stg 4 renal cell carcinoma  RCC 1st dx'd in 2016 (pt was smoker) -> s/p nephrectomy and did well for awhile  pt then found to have lung mets in 2019 and was placed on clinical trial (w/ everolimus and imatinib, he thinks) -> this was stopped because of excessive diarrhea  pt then had brain mets in 2021 -> s/o suboccipital craniotomy and RT  pt has remained under the care of SRUTHIKK since 2016  since getting dexameth, confusion is gone; pt still has some minor word-searching difficulty; he is feeling better  no reported sz events  CT Head (3/6): Ill-defined lesion within the left temporal periventricular region with significant surrounding edema as well as mild local mass effect.  s/p decadron 10 mg x 1 and keppra 1 g x 1 in the ED  CTA Neck: numerous pulm mets seen  Neurosurgery rec (3/6):  start Dexa 4 iv q6, start Keppra 1g iv q 12 -> both can be po after MRI  obtain MRI Brain w/wo - *pt has PPM, but gets MRIs at [facility] on almost a monthly basis; needs xanax pre-med  neurology consult: get REEG  pt agreeable to d/c after MRI w/ f/u at [facility]  no need for h/o    # CKD3a  Baseline Cr 1.5  On paricalcitol at home    # HTN  On chlorthalidone 25 mg nightly, nonformulary, if patient's BP is elevated, consider starting HCTZ while inpatient, otherwise, restart chlorthalidone on discharge  c/w losartan 50 mg nightly  c/w atenolol 25 mg nightly    # HLD  On simvastatin 80 mg nightly and colestipol 1g qid  Start lipitor 40 mg nightly    # COPD  On albuterol inhaler 2 g q6h PRN    # THELMA  Has CPAP at home, but doesn't use because he says it makes him suffocate  tessalon prn cough    # BPH  On home silodosin 8 mg hs  c/w tamsulosin 0.8 mg  hs here    # DVT PPx: Heparin SQ q8  # GI PPx and for GERD : Pepcid  # Activity: AAT    Dispo: Home after MRI and rEEG
67 yo man w/PMH of HTN, HLD, COPD, THELMA, and renal cell carcinoma with mets to the brain and lungs s/p sub-occipital craniotomy in 2021 and 5 rounds of radiation who presents due to aphasia and slurred speech that began on 2/29 and is now admitted to medicine for further management of 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; his son is apparently transitioning to transgender (identifying as female), which is not sitting well w/ the pt and because of this, the pt said that his son asked him, "What is taking you so long to die?" -> the pt said that this comment hurt him a lot; however, the pt never mentioned anything that even remotely sounded like suicidal ideation;  the pt also told me that he has a beautiful new girlfriend for whom he wants to continue living; his birthday is [birth year] (in 11 days) and he wanted to live for that as well; the pt also always joined us on rounds to discuss his case because he enjoyed being around people; on rounds he would always smile, even tell a joke or two; the pt did say that his brother does have a tendency to worry about him excessively and he was prob going to stop telling him every single detail about his care because the brother's well-intentioned worrying can be a bit over-the-top at times; anyway, the pt was of sound mind and fully oriented and could make his own decisions; the pt did not want to stay for MRI or [facility] tx and wanted to be d/c'd (so that was what was done);    # New onset aphasia and slurred speech 2/2 new Lt temporal brain met 2/2 stg 4 renal cell carcinoma  RCC 1st dx'd in 2016 (pt was smoker) -> s/p nephrectomy and did well for awhile  pt then found to have lung mets in 2019 and was placed on clinical trial (w/ everolimus and imatinib, he thinks) -> this was stopped because of excessive diarrhea  pt then had brain mets in 2021 -> s/o suboccipital craniotomy and RT  pt has remained under the care of SRUTHIKK since 2016  since getting dexameth, confusion is gone; pt still has some minor word-searching difficulty; he is feeling better  no reported sz events  CT Head (3/6): Ill-defined lesion within the left temporal periventricular region with significant surrounding edema as well as mild local mass effect.  s/p decadron 10 mg x 1 and keppra 1 g x 1 in the ED  CTA Neck: numerous pulm mets seen  Neurosurgery rec (3/6):  make Dexa 4 po q6, make Keppra 1g po q 12 -> both can be po after MRI  wbc elevated 2/2 steroids  obtain MRI Brain w/wo - *pt has PPM, but gets MRIs at [facility] on almost a monthly basis; needs xanax 2mg pre-med -> I tried to get this done inpt, but Rad could not get the pacer tech and the RN together today to perform the exam; the pt said he would just get it done as an outpt (I gave him a Rx) or at [facility] (has appt on 3/21)  neurology consult: REEG: Abnormal due to the presence of focal and generalized slowing as above; no icteric events  pt agreeable to d/c after MRI w/ f/u at [facility]  no need for h/o    # CKD3a  Baseline Cr 1.5  On paricalcitol at home    # HTN  On chlorthalidone 25 mg nightly, nonformulary, if patient's BP is elevated, consider starting HCTZ while inpatient, otherwise, restart chlorthalidone on discharge  c/w losartan 50 mg nightly  c/w atenolol 25 mg nightly    # HLD  On simvastatin 80 mg nightly and colestipol 1g qid  Start Lipitor 40 mg nightly    # COPD  On albuterol inhaler 2 g q6h PRN    # THELMA  Has CPAP at home, but doesn't use because he says it makes him suffocate  tessalon prn cough    # BPH  On home silodosin 8 mg hs  c/w tamsulosin 0.8 mg  hs here    # GERD  c/w home pepcid 40 mg daily    # DVT PPx: Heparin SQ q8    # GI PPx: Pepcid    # Activity: AAT    Dispo: MRI B; c/w dexa/keppra;   today, pt will go home (his request) w/ no needs
69 yo man w/PMH of HTN, HLD, COPD, THELMA, and renal cell carcinoma with mets to the brain and lungs s/p sub-occipital craniotomy in 2021 and 5 rounds of radiation who presents due to aphasia and slurred speech that began on 2/29 and is now admitted to medicine for further management of 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    # New onset aphasia and slurred speech 2/2 new Lt temporal brain met 2/2 stg 4 renal cell carcinoma  RCC 1st dx'd in 2016 (pt was smoker) -> s/p nephrectomy and did well for awhile  pt then found to have lung mets in 2019 and was placed on clinical trial (w/ everolimus and imatinib, he thinks) -> this was stopped because of excessive diarrhea  pt then had brain mets in 2021 -> s/o suboccipital craniotomy and RT  pt has remained under the care of SRUTHIKK since 2016  since getting dexameth, confusion is gone; pt still has some minor word-searching difficulty; he is feeling better  no reported sz events  CT Head (3/6): Ill-defined lesion within the left temporal periventricular region with significant surrounding edema as well as mild local mass effect.  s/p decadron 10 mg x 1 and keppra 1 g x 1 in the ED  CTA Neck: numerous pulm mets seen  Neurosurgery rec (3/6):  start Dexa 4 iv q6, start Keppra 1g iv q 12 -> both can be po after MRI  obtain MRI Brain w/wo - *pt has PPM, but gets MRIs at [facility] on almost a monthly basis; needs xanax pre-med  neurology consult: get REEG  pt agreeable to d/c after MRI w/ f/u at [facility]  no need for h/o    # CKD3a  Baseline Cr 1.5  On paricalcitol at home    # HTN  On chlorthalidone 25 mg nightly, nonformulary, if patient's BP is elevated, consider starting HCTZ while inpatient, otherwise, restart chlorthalidone on discharge  c/w losartan 50 mg nightly  c/w atenolol 25 mg nightly    # HLD  On simvastatin 80 mg nightly and colestipol 1g qid  Start lipitor 40 mg nightly    # COPD  On albuterol inhaler 2 g q6h PRN    # THELMA  Has CPAP at home, but doesn't use because he says it makes him suffocate  tessalon prn cough    # BPH  On home silodosin 8 mg hs  c/w tamsulosin 0.8 mg  hs here    # GERD  c/w home pepcid 40 mg daily    # DVT PPx: Heparin SQ q8    # GI PPx: Pepcid    # Activity: AAT    Dispo: MRI B; REEG; c/w dexa/keppra;   eventually, pt will go home w/ no needs - anticipate d/c after MRI B

## 2024-03-08 NOTE — DISCHARGE NOTE NURSING/CASE MANAGEMENT/SOCIAL WORK - NSDCVIVACCINE_GEN_ALL_CORE_FT
Tdap; 27-Dec-2019 11:37; Alice Perez (SOLOMON); [employer]; [id number] (Exp. Date: 22-Oct-2021); IntraMuscular; Deltoid Left.; 0.5 milliLiter(s); VIS (VIS Published: 09-May-2013, VIS Presented: 27-Dec-2019);

## 2024-03-08 NOTE — DISCHARGE NOTE PROVIDER - NSDCMRMEDTOKEN_GEN_ALL_CORE_FT
Albuterol (Eqv-Ventolin HFA) 90 mcg/inh inhalation aerosol: 2 puff(s) inhaled every 6 hours as needed for  bronchospasm  atenolol 25 mg oral tablet: 1 tab(s) orally once a day  benzonatate 100 mg oral capsule: 1 cap(s) orally 3 times a day as needed for  cough  chlorthalidone 25 mg oral tablet: 1 tab(s) orally once a day  cholecalciferol 50 mcg (2000 intl units) oral capsule: 1 cap(s) orally once a day  colestipol 1 g oral tablet: 1 tab(s) orally once a day  famotidine 40 mg oral tablet: 1 tab(s) orally once a day (at bedtime)  losartan 50 mg oral tablet: 1 tab(s) orally once a day  paricalcitol 1 mcg oral capsule: 1 cap(s) orally once a day  sildenafil 100 mg oral tablet: 1 tab(s) orally  silodosin 8 mg oral capsule: 1 cap(s) orally once a day  simvastatin 80 mg oral tablet: 1 tab(s) orally once a day (at bedtime)  tadalafil 5 mg oral tablet: 1 tab(s) orally   Albuterol (Eqv-Ventolin HFA) 90 mcg/inh inhalation aerosol: 2 puff(s) inhaled every 6 hours as needed for  bronchospasm  atenolol 25 mg oral tablet: 1 tab(s) orally once a day  benzonatate 100 mg oral capsule: 1 cap(s) orally 3 times a day as needed for  cough  chlorthalidone 25 mg oral tablet: 1 tab(s) orally once a day  cholecalciferol 50 mcg (2000 intl units) oral capsule: 1 cap(s) orally once a day  colestipol 1 g oral tablet: 1 tab(s) orally once a day  dexAMETHasone 4 mg oral tablet: 1 tab(s) orally every 6 hours  famotidine 40 mg oral tablet: 1 tab(s) orally once a day (at bedtime)  levETIRAcetam 1000 mg oral tablet: 1 tab(s) orally 2 times a day Take 1 tablet in the morning and 1 tablet in the evening 12h apart  losartan 50 mg oral tablet: 1 tab(s) orally once a day  paricalcitol 1 mcg oral capsule: 1 cap(s) orally once a day  sildenafil 100 mg oral tablet: 1 tab(s) orally  silodosin 8 mg oral capsule: 1 cap(s) orally once a day  simvastatin 80 mg oral tablet: 1 tab(s) orally once a day (at bedtime)

## 2024-03-08 NOTE — PROGRESS NOTE ADULT - SUBJECTIVE AND OBJECTIVE BOX
24H events:    Patient is a 68y old Male who presents with a chief complaint of Aphasia and slurred speech (07 Mar 2024 15:24)    Primary diagnosis of Cerebral edema    Today is hospital day 1d. This morning patient was seen and examined at bedside. Patient was having difficulty finding the right words but otherwise was alert and oriented.  NO chest pain, no shortness of breath - did not complain of cough  No acute or major events overnight. Hemodynamically stable, tolerating oral diet, voiding appropriately with appropriate bowel movements.     Code Status: Full code    PAST MEDICAL & SURGICAL HISTORY  HTN (hypertension)    Kidney cancer, primary, with metastasis from kidney to other site  LUNG    High cholesterol    COPD (chronic obstructive pulmonary disease)    THELMA (obstructive sleep apnea) NOT USING CPAP MACHINE    History of nephrectomy    Pacemaker    H/O lithotripsy 1979    SOCIAL HISTORY:  Social History:  Retired, worked as a  until 2014 and renovated lawns until 2018 (06 Mar 2024 23:41)    ALLERGIES:  No Known Allergies    MEDICATIONS:  STANDING MEDICATIONS  atenolol  Tablet 25 milliGRAM(s) Oral daily  atorvastatin 40 milliGRAM(s) Oral at bedtime  chlorhexidine 2% Cloths 1 Application(s) Topical <User Schedule>  cholecalciferol 2000 Unit(s) Oral daily  dexAMETHasone  Injectable 4 milliGRAM(s) IV Push every 6 hours  famotidine    Tablet 40 milliGRAM(s) Oral daily  heparin   Injectable 5000 Unit(s) SubCutaneous every 8 hours  influenza  Vaccine (HIGH DOSE) 0.7 milliLiter(s) IntraMuscular once  levETIRAcetam  IVPB 1000 milliGRAM(s) IV Intermittent every 12 hours  losartan 50 milliGRAM(s) Oral daily  tamsulosin 0.8 milliGRAM(s) Oral at bedtime    PRN MEDICATIONS  albuterol    90 MICROgram(s) HFA Inhaler 2 Puff(s) Inhalation every 6 hours PRN  ALPRAZolam 1 milliGRAM(s) Oral once PRN  benzonatate 100 milliGRAM(s) Oral three times a day PRN    VITALS:   T(F): 97.7  HR: 73  BP: 121/68  RR: 18  SpO2: --    PHYSICAL EXAM:  GENERAL: NAD, lying in bed comfortably   HEART: Regular rate and rhythm, normal S1/S2, no murmurs, heaves, thrills  LUNGS: No acute respiratory distress, clear b/l breath sounds  ABDOMEN:  soft, non-tender, non-distented, + BS  EXTREMITIES: no rashes, no edema, ulcerations or ecchymosis  NERVOUS SYSTEM:  A&Ox3, CNII-XII intact, follows commands  SKIN: No rashes or lesions       [facility] score: 24    LABS:                        14.7   9.97  )-----------( 241      ( 07 Mar 2024 06:05 )             44.5     03-07    139  |  100  |  27<H>  ----------------------------<  205<H>  4.2   |  27  |  1.4    Ca    9.6      07 Mar 2024 06:05  Mg     1.9     03-07    TPro  6.6  /  Alb  4.1  /  TBili  0.9  /  DBili  x   /  AST  12  /  ALT  13  /  AlkPhos  106  03-07    PT/INR - ( 06 Mar 2024 16:30 )   PT: 11.30 sec;   INR: 0.99 ratio         PTT - ( 06 Mar 2024 16:30 )  PTT:32.7 sec  Urinalysis Basic - ( 07 Mar 2024 06:05 )    Color: x / Appearance: x / SG: x / pH: x  Gluc: 205 mg/dL / Ketone: x  / Bili: x / Urobili: x   Blood: x / Protein: x / Nitrite: x   Leuk Esterase: x / RBC: x / WBC x   Sq Epi: x / Non Sq Epi: x / Bacteria: x    RADIOLOGY:  ACC: [id number] EXAM:  CT ANGIO BRAIN (W)AW IC   ORDERED BY: LILLY BAXTER     ACC: [id number] EXAM:  CT ANGIO NECK (W)AW IC   ORDERED BY: LILLY BAXTER     ACC: [id number] EXAM:  CT BRAIN   ORDERED BY: LILLY BAXTER     PROCEDURE DATE:  03/06/2024      INTERPRETATION:  CLINICAL INDICATION: Aphasia..    TECHNIQUE: Noncontrast head CT was performed. Sagittal and coronal reformatted images were obtained.    CT angiography of the intracranial (head) and extracranial (neck) circulation was performed after contrast administration    Maximal intensity projection images were additionally included and reviewed.    95 mls of Omnipaque 350 was administered intravenously without complication and 5 mls were discarded.    Using a separate workstation, 3-D shaded surface reformations were made of vasculature. 3-D reformations were reviewed and included in interpretation of the official report.    CAROTID STENOSIS REFERENCE: (NASCET = 100x1-(N/D)). N=greatest narrowing.   D=normal distal diameter - MILD = <50% stenosis. - MODERATE = 50-69%   stenosis. - SEVERE = 70-89% stenosis. - HAIRLINE/CRITICAL = 90-99%   stenosis. - OCCLUDED = 100% stenosis.    COMPARISON: CT head dated 12/27/2019..    FINDINGS:    CT BRAIN:    There is an ill-defined lesion noted within the left temporal periventricular white matter with significant surrounding vasogenic edema as well as mild local mass effect.    Patient is status post left suboccipital craniotomy with patchy heterogeneous density involving the underlying cerebellar hemisphere.    There is no extra-axial collection.    The visualized orbits are unremarkable.    The calvarium is intact, without depressed fracture.    The visualized paranasal sinuses and mastoid air cells are clear.    HEAD CTA:    The internal carotid arteries demonstrate normal enhancement to the intracranial circulation and [place]. There is a 1.5 mm infundibulum noted arising from the right posterior creating artery   origin.    Anterior and middle cerebral arteries demonstrate normal enhancement and symmetric arborization without intraluminal filling defect, stenosis, occlusion, aneurysm or vascular malformation.    The intradural vertebral arteries demonstrate normal enhancement to thevertebrobasilar confluence. Branch vasculature of the posterior circulation are within normal limits. The posterior cerebral arteries demonstrate symmetric enhancement and arborization without evidence for aneurysm, stenosis, occlusion or vascular malformation.    Visualized dural venous sinuses and deep cerebral venous structures demonstrate normal enhancement without evidence for filling defect.    NECK CTA:    The aortic arch and origins of the great vessels are within normal limits.    Right:  The origin of the right common carotid artery is normal. The right common carotid artery is normal in course and caliber to the carotid bifurcation. Origins of the internal and external carotid arteries are normal by NASCET criteria.The right internal carotid artery has normal course and caliber to the intracranial circulation.    The origin of the right vertebral artery is normal. The right vertebral artery is normal in course and caliber to the intracranial circulation.    Left: The origin of the left common carotid artery is normal. The left common carotid artery is normal in course and caliber to the carotid bifurcation. Origins of the internal and external carotid arteries are normal by NASCET criteria. The left internal carotid artery has normal course and caliber to the intracranial circulation.    The origin of the left vertebral artery is normal. The left vertebral artery is normal in course and caliber to the intracranial circulation.    A left-sided chest pacemaker is noted.    There are numerous bilateral lung nodules noted, measuring up to 1.5 cm in the right upper lobe.    IMPRESSION:  CT HEAD:  Ill-defined lesion within the left temporal periventricular region with significant surrounding edema as well as mild local mass effect.    Left suboccipital craniotomy postsurgical changes are noted with patchy heterogeneous density involving the underlying cerebellar hemisphere. An underlying mass lesion cannot be excluded. A dedicated contrast enhanced brain MRI is recommended for further evaluation.    CTA HEAD:  No evidence of flow-limiting stenosis, occlusion or aneurysm.    CTA NECK:  No evidence of carotid or vertebral artery stenosis.    Numerous pulmonary nodules are noted concerning for metastases.    --- End of Report ---    NIKKI RICE MD; Attending Radiologist  This document has been electronically signed. Mar  6 2024  5:46PM  
  RICHARD YOUNG  68y  Male  ***My note supersedes ALL resident notes that I sign.  My corrections for their notes are in my note.***    I can be reached directly on [employer]. My office number is [phone]. My personal cell number is [phone].    INTERVAL EVENTS: Here for f/u of  renal cancer w/ mets. Pt is very nice and speaks well, just has some word searching issues and occ "fumbles" over correct pronunciation of some words. He has no H/A. He is a little tired and was taking naps this AM, but was fully awake and alert when talking w/ me.    T(F): 97.7 (03-07-24 @ 13:24), Max: 98.2 (03-06-24 @ 14:16)  HR: 73 (03-07-24 @ 13:24) (68 - 88)  BP: 121/68 (03-07-24 @ 13:24) (113/77 - 135/91)  RR: 18 (03-07-24 @ 13:24) (18 - 18)  SpO2: 96% (03-06-24 @ 16:50) (94% - 96%)    Gen: NAD  HEENT: PERRL, EOMI, mouth clr, nose clr; post scar on sclap  Neck: no nodes, no JVD, thyroid nl  lungs: clr  hrt: s1 s2 rrr no murmur  abd: soft, NT/ND, no HS megaly  ext: no edema, no c/c  neuro: aa ox3, stevie word-searching; cn intact, can move all 4 ext    LABS:                      14.7    (    84.9   9.97  )-----------( ---------      241      ( 07 Mar 2024 06:05 )             44.5    (    13.3     139   (   100   (   205      03-07-24 @ 06:05  ----------------------               4.2   (   27   (   27                             -----                        1.4  Ca  9.6   Mg  1.9    P   --     LFT  6.6  (  0.9  (  12       03-07-24 @ 06:05  -------------------------  4.1  (  106  (  13    PT/INR - ( 06 Mar 2024 16:30 )   PT: 11.30 sec;   INR: 0.99 ratio    PTT - ( 06 Mar 2024 16:30 )  PTT: 32.7 sec    Urinalysis Basic - ( 07 Mar 2024 06:05 )    Color: x / Appearance: x / SG: x / pH: x  Gluc: 205 mg/dL / Ketone: x  / Bili: x / Urobili: x   Blood: x / Protein: x / Nitrite: x   Leuk Esterase: x / RBC: x / WBC x   Sq Epi: x / Non Sq Epi: x / Bacteria: x    RADIOLOGY & ADDITIONAL TESTS:  < from: CT Angio Neck w/ IV Cont (03.06.24 @ 17:26) >  IMPRESSION:  CT HEAD:  Ill-defined lesion within the left temporal periventricular region with   significant surrounding edema as well as mild local mass effect.    Left suboccipital craniotomy postsurgical changes are noted with patchy   heterogeneous density involving the underlying cerebellar hemisphere. An   underlying mass lesion cannot be excluded. A dedicated contrast enhanced   brain MRI is recommended for further evaluation.    CTA HEAD:  No evidence of flow-limiting stenosis, occlusion or aneurysm.    CTA NECK:  No evidence of carotid or vertebral artery stenosis.    Numerous pulmonary nodules are noted concerning for metastases.    < end of copied text >    < from: Xray Chest 1 View- PORTABLE-Urgent (Xray Chest 1 View- PORTABLE-Urgent .) (03.06.24 @ 17:10) >  IMPRESSION:    Multiple new bilateral pulmonary nodules. Further evaluation with CT and   correlation for a malignancy is recommended.    No focal consolidation, pneumothorax or pleural effusion.    Stable cardiomediastinal silhouette.    No radiographically evident acute displaced fracture within the limitations of this exam.    < end of copied text >    MEDICATIONS:    albuterol    90 MICROgram(s) HFA Inhaler 2 Puff(s) Inhalation every 6 hours PRN  atenolol  Tablet 25 milliGRAM(s) Oral daily  atorvastatin 40 milliGRAM(s) Oral at bedtime  benzonatate 100 milliGRAM(s) Oral three times a day PRN  chlorhexidine 2% Cloths 1 Application(s) Topical <User Schedule>  cholecalciferol 2000 Unit(s) Oral daily  dexAMETHasone  Injectable 4 milliGRAM(s) IV Push every 6 hours  famotidine    Tablet 40 milliGRAM(s) Oral daily  heparin   Injectable 5000 Unit(s) SubCutaneous every 8 hours  influenza  Vaccine (HIGH DOSE) 0.7 milliLiter(s) IntraMuscular once  levETIRAcetam  IVPB 1000 milliGRAM(s) IV Intermittent every 12 hours  losartan 50 milliGRAM(s) Oral daily  tamsulosin 0.8 milliGRAM(s) Oral at bedtime  
  KISHANIKITARICHARD HENDERSON  68y  Male  ***My note supersedes ALL resident notes that I sign.  My corrections for their notes are in my note.***    I can be reached directly on [employer] [id number]. My office number is [phone]. My personal cell number is [phone].    INTERVAL EVENTS: Here for f/u of brain met. Pt doing fine. Walks well. Speaking ok w/ minor word-searching effort. Not confused. Rad could not do his MRI today w/ his PPM and pt did not want to stay another night to attempt it [place]. He has an appt for a MRI - B w/ [facility] after his [birth year] on [birth year] - he thinks on Th 3[birth year]. He also wanted to f/u w/ [place] onc [place] [facility]. [facility] personnel said tx would also take awhile (if at all) and pt did not want to stay for attempt at transfer either.    T(F): 98.7 (03-08-24 @ 13:17), Max: 98.7 (03-08-24 @ 13:17)  HR: 69 (03-08-24 @ 13:17) (64 - 76)  BP: 127/65 (03-08-24 @ 13:17) (117/62 - 134/72)  RR: 18 (03-08-24 @ 13:17) (18 - 18)  SpO2: --    Gen: NAD  HEENT: PERRL, EOMI, mouth clr, nose clr; post scar on sclap  Neck: no nodes, no JVD, thyroid nl  lungs: clr  hrt: s1 s2 rrr no murmur  abd: soft, NT/ND, no HS megaly  ext: no edema, no c/c  neuro: aa ox3, some word-searching; cn intact, can move all 4 ext    LABS:                      15.4    (    84.2   21.65 )-----------( ---------      286      ( 08 Mar 2024 06:57 )             46.2    (    13.5   on steroids    138   (   100   (   186      03-08-24 @ 06:57  ----------------------               4.3   (   25   (   40                             -----                        1.7  Ca  10.2   Mg  --    P   --     Creatinine:   1.7 (03-08 @ 06:57)  eGFR:  44    Creatinine:   1.4 (03-07 @ 06:05)  eGFR:  55    Creatinine:   1.4 (03-06 @ 16:30)  eGFR:  55      Alb 4.1  T inez 0.9     AST 12  ALT 13  03-07-24 @ 06:05  Alb 4.3  T inez 1.1     AST 13  ALT 16  03-06-24 @ 16:30    Urinalysis Basic - ( 08 Mar 2024 06:57 )    Color: x / Appearance: x / SG: x / pH: x  Gluc: 186 mg/dL / Ketone: x  / Bili: x / Urobili: x   Blood: x / Protein: x / Nitrite: x   Leuk Esterase: x / RBC: x / WBC x   Sq Epi: x / Non Sq Epi: x / Bacteria: x    RADIOLOGY & ADDITIONAL TESTS:    MEDICATIONS:    albuterol    90 MICROgram(s) HFA Inhaler 2 Puff(s) Inhalation every 6 hours PRN  ALPRAZolam 1 milliGRAM(s) Oral once PRN  atenolol  Tablet 25 milliGRAM(s) Oral at bedtime  atorvastatin 40 milliGRAM(s) Oral at bedtime  benzonatate 100 milliGRAM(s) Oral three times a day PRN  chlorhexidine 2% Cloths 1 Application(s) Topical <User Schedule>  cholecalciferol 2000 Unit(s) Oral daily  dexAMETHasone  Injectable 4 milliGRAM(s) IV Push every 6 hours  famotidine    Tablet 40 milliGRAM(s) Oral daily  heparin   Injectable 5000 Unit(s) SubCutaneous every 8 hours  influenza  Vaccine (HIGH DOSE) 0.7 milliLiter(s) IntraMuscular once  levETIRAcetam  IVPB 1000 milliGRAM(s) IV Intermittent every 12 hours  losartan 50 milliGRAM(s) Oral at bedtime  tamsulosin 0.8 milliGRAM(s) Oral at bedtime

## 2024-03-08 NOTE — DISCHARGE NOTE NURSING/CASE MANAGEMENT/SOCIAL WORK - NSDCPEFALRISK_GEN_ALL_CORE
For information on Fall & Injury Prevention, visit: [email or web address] OR  [email or web address] OR  https://www.cdc.gov/steadi/patient.html

## 2024-03-08 NOTE — DISCHARGE NOTE PROVIDER - HOSPITAL COURSE
Patient is a 67 yo M w/PMH of HTN, HLD, COPD, THELMA, and renal cell carcinoma with mets to the brain and lungs s/p sub-occipital craniotomy in 2021 and 5 rounds of radiation who presents due to aphasia and slurred speech that began on 2/29. He reports being able to speak and think fine prior to Friday but since Friday, he has been having word finding difficulties even though he is able to understand what other people are saying completely fine. He denies having any seizures, vision changes, hearing changes, facial droop, numbness, weakness, or tingling anywhere throughout his body. He also endorses a cough that began about 3-4 weeks ago. He denies any other symptoms, insisting that he feels fine and that he would like to go home tomorrow since he would rather receive his care with the doctors who were managing his cancer at [facility]. He sees Dr. Naveed Dickerson, who is his radiation oncologist. After talking with him for a bit, he was amenable to receiving the MRI of his brain here as long as it happens tomorrow. He denies any allergy to contrast.    His ED vitals were stable, labs notable for Cr 1.4, , and T.bili 1.1, and CT head was notable for an ill defined lesion in the L temporal periventricular region w/edema and mass effect. The neurosurgery team saw him in the ED and recommended that he get keppra for AED, dexamethasone, and an MRI brain w/wo IC. He received 1 g keppra and 10 mg decadron in the ED. 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    67 yo man w/PMH of HTN, HLD, COPD, THELMA, and renal cell carcinoma with mets to the brain and lungs s/p sub-occipital craniotomy in 2021 and 5 rounds of radiation who presents due to aphasia and slurred speech that began on 2/29 and is now admitted to medicine for further management of 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    # New onset aphasia and slurred speech 2/2 new Lt temporal brain met 2/2 stg 4 renal cell carcinoma  RCC 1st dx'd in 2016 (pt was smoker) -> s/p nephrectomy and did well for awhile  pt then found to have lung mets in 2019 and was placed on clinical trial (w/ everolimus and imatinib, he thinks) -> this was stopped because of excessive diarrhea  pt then had brain mets in 2021 -> s/o suboccipital craniotomy and RT  pt has remained under the care of [facility] since 2016  since getting dexameth, confusion is gone; pt still has some minor word-searching difficulty; he is feeling better  no reported sz events  CT Head (3/6): Ill-defined lesion within the left temporal periventricular region with significant surrounding edema as well as mild local mass effect.  s/p decadron 10 mg x 1 and keppra 1 g x 1 in the ED  CTA Neck: numerous pulm mets seen  Neurosurgery rec (3/6):  start Dexa 4 iv q6, start Keppra 1g iv q 12 -> both can be po after MRI  obtain MRI Brain w/wo - *pt has PPM, but gets MRIs at [facility] on almost a monthly basis; needs xanax pre-med  neurology consult: get REEG  pt agreeable to d/c after [place]/ [place] [facility]  no need for h/o    # CKD3a  Baseline Cr 1.5  On paricalcitol at home    # HTN  On chlorthalidone 25 mg nightly, nonformulary, if patient's BP is elevated, consider starting HCTZ while inpatient, otherwise, restart chlorthalidone on discharge  c/w losartan 50 mg nightly  c/w atenolol 25 mg nightly    # HLD  On simvastatin 80 mg nightly and colestipol 1g qid  Start lipitor 40 mg nightly    # COPD  On albuterol inhaler 2 g q6h PRN    # THELMA  Has CPAP at home, but doesn't use because he says it makes him suffocate  tessalon prn cough    # BPH  On home silodosin 8 mg hs  c/w tamsulosin 0.8 mg  hs here    # GERD  c/w home pepcid 40 mg daily    # DVT PPx: Heparin SQ q8    # GI PPx: Pepcid    # Activity: AAT    Dispo: MRI B; REEG; c/w dexa/keppra;   eventually, pt will go home w/ no needs - anticipate d/c after MRI B    Patient is a 67 yo M w/PMH of HTN, HLD, COPD, THELMA, and renal cell carcinoma with mets to the brain and lungs s/p sub-occipital craniotomy in 2021 and 5 rounds of radiation who presents due to aphasia and slurred speech that began on 2/29. He reports being able to speak and think fine prior to Friday but since Friday, he has been having word finding difficulties even though he is able to understand what other people are saying completely fine. He denies having any seizures, vision changes, hearing changes, facial droop, numbness, weakness, or tingling anywhere throughout his body. He also endorses a cough that began about 3-4 weeks ago. He denies any other symptoms, insisting that he feels fine and that he would like to go home tomorrow since he would rather receive his care with the doctors who were managing his cancer at [facility]. He sees Dr. Naveed Dickerson, who is his radiation oncologist. After talking with him for a bit, he was amenable to receiving the MRI of his brain here as long as it happens tomorrow. He denies any allergy to contrast.    His ED vitals were stable, labs notable for Cr 1.4, , and T.bili 1.1, and CT head was notable for an ill defined lesion in the L temporal periventricular region w/edema and mass effect. The neurosurgery team saw him in the ED and recommended that he get keppra for AED, dexamethasone, and an MRI brain w/wo IC. He received 1 g keppra and 10 mg decadron in the ED. 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    67 yo man w/PMH of HTN, HLD, COPD, THELMA, and renal cell carcinoma with mets to the brain and lungs s/p sub-occipital craniotomy in 2021 and 5 rounds of radiation who presents due to aphasia and slurred speech that began on 2/29 and is now admitted to medicine for further management of 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    # New onset aphasia and slurred speech 2/2 new Lt temporal brain met 2/2 stg 4 renal cell carcinoma  RCC 1st dx'd in 2016 (pt was smoker) -> s/p nephrectomy and did well for awhile  pt then found to have lung mets in 2019 and was placed on clinical trial (w/ everolimus and imatinib, he thinks) -> this was stopped because of excessive diarrhea  pt then had brain mets in 2021 -> s/o suboccipital craniotomy and RT  pt has remained under the care of [facility] since 2016  since getting dexameth, confusion is gone; pt still has some minor word-searching difficulty; he is feeling better  no reported sz events  CT Head (3/6): Ill-defined lesion within the left temporal periventricular region with significant surrounding edema as well as mild local mass effect.  s/p decadron 10 mg x 1 and keppra 1 g x 1 in the ED  CTA Neck: numerous pulm mets seen  Neurosurgery rec (3/6):  start Dexa 4 iv q6, start Keppra 1g iv q 12 -> both can be po after MRI  MRI Brain w/wo *pt has PPM, but gets MRIs at [facility] on almost a monthly basis  neurology consult: REEG showed left focal and diffuse electrocerebral dysfunction  pt agreeable to d/c after MRI w/ f/u at [facility]  no need for h/o    # CKD3a  Baseline Cr 1.5  On paricalcitol at home    # HTN  On chlorthalidone 25 mg nightly, nonformulary, if patient's BP is elevated, consider starting HCTZ while inpatient, otherwise, restart chlorthalidone on discharge  c/w losartan 50 mg nightly  c/w atenolol 25 mg nightly    # HLD  On simvastatin 80 mg nightly and colestipol 1g qid  Start lipitor 40 mg nightly    # COPD  On albuterol inhaler 2 g q6h PRN    # THELMA  Has CPAP at home, but doesn't use because he says it makes him suffocate  tessalon prn cough    # BPH  On home silodosin 8 mg hs  c/w tamsulosin 0.8 mg  hs here    # GERD  c/w home pepcid 40 mg daily    # DVT PPx: Heparin SQ q8    # GI PPx: Pepcid    # Activity: AAT    Dispo: Patient will be discharged today on PO Decadron and Keppra  Updates communicated to the radiation oncology and neurosurgery team  Dexamethasone 4 mg every 6h and Keppra 1g every 12h.   Follow up with your MRI appointment at [facility], Dr. Shetty signed a script for early appointment   [place] up as soon as possible [place]. [place] [facility]

## 2024-03-14 DIAGNOSIS — Z87.891 PERSONAL HISTORY OF NICOTINE DEPENDENCE: ICD-10-CM

## 2024-03-14 DIAGNOSIS — C78.00 SECONDARY MALIGNANT NEOPLASM OF UNSPECIFIED LUNG: ICD-10-CM

## 2024-03-14 DIAGNOSIS — N18.31 CHRONIC KIDNEY DISEASE, STAGE 3A: ICD-10-CM

## 2024-03-14 DIAGNOSIS — Z95.0 PRESENCE OF CARDIAC PACEMAKER: ICD-10-CM

## 2024-03-14 DIAGNOSIS — G47.33 OBSTRUCTIVE SLEEP APNEA (ADULT) (PEDIATRIC): ICD-10-CM

## 2024-03-14 DIAGNOSIS — C79.31 SECONDARY MALIGNANT NEOPLASM OF BRAIN: ICD-10-CM

## 2024-03-14 DIAGNOSIS — N40.0 BENIGN PROSTATIC HYPERPLASIA WITHOUT LOWER URINARY TRACT SYMPTOMS: ICD-10-CM

## 2024-03-14 DIAGNOSIS — I12.9 HYPERTENSIVE CHRONIC KIDNEY DISEASE WITH STAGE 1 THROUGH STAGE 4 CHRONIC KIDNEY DISEASE, OR UNSPECIFIED CHRONIC KIDNEY DISEASE: ICD-10-CM

## 2024-03-14 DIAGNOSIS — G93.6 CEREBRAL EDEMA: ICD-10-CM

## 2024-03-14 DIAGNOSIS — Z92.3 PERSONAL HISTORY OF IRRADIATION: ICD-10-CM

## 2024-03-14 DIAGNOSIS — J44.9 CHRONIC OBSTRUCTIVE PULMONARY DISEASE, UNSPECIFIED: ICD-10-CM

## 2024-03-14 DIAGNOSIS — Z85.528 PERSONAL HISTORY OF OTHER MALIGNANT NEOPLASM OF KIDNEY: ICD-10-CM

## 2024-03-14 DIAGNOSIS — K21.9 GASTRO-ESOPHAGEAL REFLUX DISEASE WITHOUT ESOPHAGITIS: ICD-10-CM

## 2024-03-14 DIAGNOSIS — E78.5 HYPERLIPIDEMIA, UNSPECIFIED: ICD-10-CM

## 2024-08-07 ENCOUNTER — APPOINTMENT (OUTPATIENT)
Dept: ORTHOPEDIC SURGERY | Facility: CLINIC | Age: 69
End: 2024-08-07

## 2024-08-07 PROCEDURE — 99213 OFFICE O/P EST LOW 20 MIN: CPT | Mod: 25

## 2024-08-07 PROCEDURE — 73562 X-RAY EXAM OF KNEE 3: CPT | Mod: RT

## 2024-08-07 PROCEDURE — 20610 DRAIN/INJ JOINT/BURSA W/O US: CPT | Mod: RT

## 2024-08-07 NOTE — DISCUSSION/SUMMARY
[de-identified] : Chief complaint: Right knee pain  HPI: Patient is a 69-year-old male who presents the office today for evaluation of pain to the right knee which manifested Sunday, 8/4/2024 into Monday, 8/5/2024 without any known fall, injury, or trauma.  He reports that he has a dull aching pain constantly however the pain is worse and is sharp with ambulation and weightbearing.  Patient has 1 kidney as a result of a history of cancer and is not supposed to be taking NSAIDs however he reports that the pain was so bad that he did take 2 Aleve followed by an additional Aleve later in the day in order to manage his pain.  He does report a history of right knee surgery in the past performed by Dr. Watts however he does not recall the exact surgical intervention.    ROS: Positive for right knee pain  Physical examination of the right knee shows: No erythema  No ecchymosis  Tenderness to palpation superior and lateral to the patella Mild effusion Able to perform active straight leg raise Knee flexion from 0-100 degrees Light touch is intact throughout Antalgic gait Negative Gita's Negative anterior Drawer No appreciable instability with varus / valgus stress testing Calves are soft and nontender  Three-view x-rays of the right knee performed the office today show no obvious acute fracture, subluxation, or dislocation, chondrocalcinosis noted  Assessment/plan: Chondrocalcinosis of the right knee, discussed treatment options with the patient, patient is unable to take NSAIDs secondary to his history of nephrectomy status post kidney cancer, recommend physical therapy, patient was provided with a prescription for formal physical therapy for the right knee so that he can get started on that, patient denies being diabetic, discussed the possibility of an intra-articular corticosteroid injection today, discussed all associated risks and benefits including but not limited to increased pain, no relief with injection, and infection to the knee, patient verbalized understanding of all associated risk benefits and decided to proceed with the procedure, procedure note attached, patient tolerated the procedure well, post care instructions discussed in detail with the patient, patient can apply ice or heat to the right knee on an as-needed basis with sensory precautions, he will be provided with a 6-week follow-up with Dr. Watts for repeat evaluation, patient verbalized understanding of all findings in the office today, he agrees to follow-up as directed

## 2024-08-07 NOTE — PROCEDURE
[Large Joint Injection] : Large joint injection [Right] : of the right [Knee] : knee [Pain] : pain [Alcohol] : alcohol [Ethyl Chloride sprayed topically] : ethyl chloride sprayed topically [Sterile technique used] : sterile technique used [____] : [unfilled] [] : Patient tolerated procedure well [Call if redness, pain or fever occur] : call if redness, pain or fever occur [Apply ice for 15min out of every hour for the next 12-24 hours as tolerated] : apply ice for 15 minutes out of every hour for the next 12-24 hours as tolerated [Patient was advised to rest the joint(s) for ____ days] : patient was advised to rest the joint(s) for [unfilled] days [Risks, benefits, alternatives discussed / Verbal consent obtained] : the risks benefits, and alternatives have been discussed, and verbal consent was obtained

## 2024-09-11 ENCOUNTER — APPOINTMENT (OUTPATIENT)
Dept: ORTHOPEDIC SURGERY | Facility: CLINIC | Age: 69
End: 2024-09-11

## 2024-09-11 ENCOUNTER — APPOINTMENT (OUTPATIENT)
Dept: ORTHOPEDIC SURGERY | Facility: CLINIC | Age: 69
End: 2024-09-11
Payer: MEDICARE

## 2024-09-11 VITALS — WEIGHT: 240 LBS | BODY MASS INDEX: 35.55 KG/M2 | HEIGHT: 69 IN

## 2024-09-11 PROCEDURE — 73562 X-RAY EXAM OF KNEE 3: CPT | Mod: LT

## 2024-09-11 PROCEDURE — 20610 DRAIN/INJ JOINT/BURSA W/O US: CPT | Mod: LT

## 2024-09-11 PROCEDURE — 99213 OFFICE O/P EST LOW 20 MIN: CPT | Mod: 25

## 2024-09-11 NOTE — DISCUSSION/SUMMARY
[de-identified] : Chief complaint: Left knee pain  HPI: Patient is a 69-year-old male who presents to the office today for the evaluation of left knee pain.  Patient reports that he was at physical therapy about 1 week ago for his right knee at which time he felt a pop in the left knee.  Since that time he has been having pain to the left knee which is primarily present with range of motion as well as with weightbearing activity on the left lower extremity.  He denies any fall, injury, or trauma.  Patient does not typically take NSAIDs secondary to his history of nephrectomy.  No reported previous surgical intervention to the left knee.  ROS: Positive for left knee pain  Physical examination of the left knee shows: No erythema  No ecchymosis  Tenderness to palpation over the suprapatellar area No appreciable effusion Able to perform active straight leg raise Knee flexion from 0-110 degrees Light touch is intact throughout Negative Gita's Negative anterior Drawer No appreciable instability with varus / valgus stress testing Calves are soft and nontender  Three-view x-rays of the left knee performed in the office today show no obvious acute displaced fracture, subluxation, or dislocation, mild degenerative changes, chondrocalcinosis  Assessment/plan: Chondrocalcinosis of the left knee, discussed ongoing treatment options with the patient, again the patient does not typically take NSAIDs secondary to his history of nephrectomy, patient can take over-the-counter Tylenol on an as needed basis, he can apply ice or heat to the left knee on an as-needed basis with sensory precautions, patient was provided with a prescription for formal physical therapy for the left knee so that he can get started on that  Discussed the possibility of an intra-articular corticosteroid injection today, discussed all associated risks and benefits including but not limited to no improvement with injection, increased pain with injection, sepsis, infection to the joint, hypo-/hyperpigmentation of the skin, patient verbalized understanding of all associated risks and benefits and decided to proceed with the procedure, verbal consent obtained, procedure note attached, patient tolerated the procedure well, post care instructions discussed in detail with the patient  Patient was supposed to follow-up with Dr. Watts this week in regards to his right knee, the appointment was canceled, he will be provided with a follow-up with Dr. Watts for his next opening, patient verbalized understanding of all findings in the office today, he agrees to follow-up as directed

## 2024-09-11 NOTE — PROCEDURE
[Large Joint Injection] : Large joint injection [Left] : of the left [Knee] : knee [Pain] : pain [Alcohol] : alcohol [Ethyl Chloride sprayed topically] : ethyl chloride sprayed topically [Sterile technique used] : sterile technique used [____] : [unfilled] [] : Patient tolerated procedure well [Call if redness, pain or fever occur] : call if redness, pain or fever occur [Apply ice for 15min out of every hour for the next 12-24 hours as tolerated] : apply ice for 15 minutes out of every hour for the next 12-24 hours as tolerated [Patient was advised to rest the joint(s) for ____ days] : patient was advised to rest the joint(s) for [unfilled] days [Risks, benefits, alternatives discussed / Verbal consent obtained] : the risks benefits, and alternatives have been discussed, and verbal consent was obtained

## 2024-09-18 ENCOUNTER — APPOINTMENT (OUTPATIENT)
Dept: ORTHOPEDIC SURGERY | Facility: CLINIC | Age: 69
End: 2024-09-18
Payer: MEDICARE

## 2024-09-18 DIAGNOSIS — M25.561 PAIN IN RIGHT KNEE: ICD-10-CM

## 2024-09-18 DIAGNOSIS — M11.262 OTHER CHONDROCALCINOSIS, LEFT KNEE: ICD-10-CM

## 2024-09-18 PROCEDURE — 99203 OFFICE O/P NEW LOW 30 MIN: CPT

## 2024-09-18 NOTE — HISTORY OF PRESENT ILLNESS
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

## 2024-11-01 NOTE — ED PROVIDER NOTE - [id number]_ED_[id number]_[email or web address]
Attending Attestation (For Attendings USE Only)...
Detail Level: Detailed
X Size Of Lesion In Cm (Optional): 0

## 2024-12-05 ENCOUNTER — APPOINTMENT (OUTPATIENT)
Dept: ORTHOPEDIC SURGERY | Facility: CLINIC | Age: 69
End: 2024-12-05
Payer: MEDICARE

## 2024-12-05 DIAGNOSIS — M11.262 OTHER CHONDROCALCINOSIS, LEFT KNEE: ICD-10-CM

## 2024-12-05 DIAGNOSIS — M25.561 PAIN IN RIGHT KNEE: ICD-10-CM

## 2024-12-05 PROCEDURE — 99213 OFFICE O/P EST LOW 20 MIN: CPT

## 2025-05-27 ENCOUNTER — NON-APPOINTMENT (OUTPATIENT)
Age: 70
End: 2025-05-27

## 2025-05-27 DIAGNOSIS — Z81.8 FAMILY HISTORY OF OTHER MENTAL AND BEHAVIORAL DISORDERS: ICD-10-CM

## 2025-05-27 DIAGNOSIS — Z87.09 PERSONAL HISTORY OF OTHER DISEASES OF THE RESPIRATORY SYSTEM: ICD-10-CM

## 2025-05-27 DIAGNOSIS — K21.9 GASTRO-ESOPHAGEAL REFLUX DISEASE W/OUT ESOPHAGITIS: ICD-10-CM

## 2025-05-27 DIAGNOSIS — Z82.5 FAMILY HISTORY OF ASTHMA AND OTHER CHRONIC LOWER RESPIRATORY DISEASES: ICD-10-CM

## 2025-05-27 DIAGNOSIS — F17.200 NICOTINE DEPENDENCE, UNSPECIFIED, UNCOMPLICATED: ICD-10-CM

## 2025-05-27 DIAGNOSIS — R91.1 SOLITARY PULMONARY NODULE: ICD-10-CM

## 2025-05-27 DIAGNOSIS — Z83.79 FAMILY HISTORY OF OTHER DISEASES OF THE DIGESTIVE SYSTEM: ICD-10-CM

## 2025-05-27 RX ORDER — FLUTICASONE FUROATE, UMECLIDINIUM BROMIDE AND VILANTEROL TRIFENATATE 100; 62.5; 25 UG/1; UG/1; UG/1
100-62.5-25 POWDER RESPIRATORY (INHALATION)
Refills: 0 | Status: ACTIVE | COMMUNITY

## 2025-09-08 ENCOUNTER — APPOINTMENT (OUTPATIENT)
Age: 70
End: 2025-09-08
Payer: COMMERCIAL

## 2025-09-08 VITALS
TEMPERATURE: 97 F | SYSTOLIC BLOOD PRESSURE: 155 MMHG | HEIGHT: 69 IN | WEIGHT: 231 LBS | RESPIRATION RATE: 14 BRPM | HEART RATE: 78 BPM | OXYGEN SATURATION: 95 % | DIASTOLIC BLOOD PRESSURE: 81 MMHG | BODY MASS INDEX: 34.21 KG/M2

## 2025-09-08 DIAGNOSIS — C78.00 SECONDARY MALIGNANT NEOPLASM OF UNSPECIFIED LUNG: ICD-10-CM

## 2025-09-08 DIAGNOSIS — C64.9 SECONDARY MALIGNANT NEOPLASM OF UNSPECIFIED LUNG: ICD-10-CM

## 2025-09-08 DIAGNOSIS — J44.9 CHRONIC OBSTRUCTIVE PULMONARY DISEASE, UNSPECIFIED: ICD-10-CM

## 2025-09-08 DIAGNOSIS — K21.9 GASTRO-ESOPHAGEAL REFLUX DISEASE W/OUT ESOPHAGITIS: ICD-10-CM

## 2025-09-08 DIAGNOSIS — J43.2 CENTRILOBULAR EMPHYSEMA: ICD-10-CM

## 2025-09-08 DIAGNOSIS — R91.8 OTHER NONSPECIFIC ABNORMAL FINDING OF LUNG FIELD: ICD-10-CM

## 2025-09-08 DIAGNOSIS — G47.33 OBSTRUCTIVE SLEEP APNEA (ADULT) (PEDIATRIC): ICD-10-CM

## 2025-09-08 DIAGNOSIS — R05.3 CHRONIC COUGH: ICD-10-CM

## 2025-09-08 DIAGNOSIS — Z87.891 PERSONAL HISTORY OF NICOTINE DEPENDENCE: ICD-10-CM

## 2025-09-08 PROCEDURE — 99214 OFFICE O/P EST MOD 30 MIN: CPT

## 2025-09-08 RX ORDER — SILODOSIN 8 MG/1
8 CAPSULE ORAL
Refills: 0 | Status: ACTIVE | COMMUNITY

## 2025-09-08 RX ORDER — PARICALCITOL 1 UG/1
1 CAPSULE ORAL
Refills: 0 | Status: ACTIVE | COMMUNITY

## 2025-09-08 RX ORDER — SIMVASTATIN 40 MG/1
40 TABLET, FILM COATED ORAL
Refills: 0 | Status: ACTIVE | COMMUNITY

## 2025-09-08 RX ORDER — METFORMIN HYDROCHLORIDE 500 MG/1
500 TABLET, COATED ORAL
Refills: 0 | Status: ACTIVE | COMMUNITY